# Patient Record
Sex: MALE | Race: WHITE | Employment: OTHER | URBAN - METROPOLITAN AREA
[De-identification: names, ages, dates, MRNs, and addresses within clinical notes are randomized per-mention and may not be internally consistent; named-entity substitution may affect disease eponyms.]

---

## 2017-05-24 ENCOUNTER — HOSPITAL ENCOUNTER (OUTPATIENT)
Dept: SURGERY | Age: 83
Discharge: HOME | End: 2017-05-24
Attending: OPHTHALMOLOGY | Admitting: OPHTHALMOLOGY

## 2017-05-24 VITALS — HEART RATE: 72 BPM | SYSTOLIC BLOOD PRESSURE: 144 MMHG | DIASTOLIC BLOOD PRESSURE: 79 MMHG

## 2017-05-24 RX ORDER — PHENYLEPHRINE HCL 2.5 %
1 DROPS OPHTHALMIC (EYE) EVERY 5 MIN PRN
Status: DISCONTINUED | OUTPATIENT
Start: 2017-05-24 | End: 2017-05-25 | Stop reason: HOSPADM

## 2017-05-24 RX ORDER — PREDNISOLONE ACETATE 10 MG/ML
1 SUSPENSION/ DROPS OPHTHALMIC
Status: COMPLETED | OUTPATIENT
Start: 2017-05-24 | End: 2017-05-24

## 2017-05-24 RX ORDER — LEVOTHYROXINE SODIUM 0.05 MG/1
50 TABLET ORAL DAILY
COMMUNITY

## 2017-05-24 RX ORDER — COVID-19 ANTIGEN TEST
1 KIT MISCELLANEOUS DAILY
COMMUNITY

## 2017-05-24 RX ORDER — PROPARACAINE HYDROCHLORIDE 5 MG/ML
1 SOLUTION/ DROPS OPHTHALMIC
Status: COMPLETED | OUTPATIENT
Start: 2017-05-24 | End: 2017-05-24

## 2017-05-24 RX ORDER — TROPICAMIDE 10 MG/ML
1 SOLUTION/ DROPS OPHTHALMIC EVERY 5 MIN PRN
Status: DISCONTINUED | OUTPATIENT
Start: 2017-05-24 | End: 2017-05-25 | Stop reason: HOSPADM

## 2017-05-24 RX ADMIN — TROPICAMIDE 1 DROP: 10 SOLUTION/ DROPS OPHTHALMIC at 14:06

## 2017-05-24 RX ADMIN — PREDNISOLONE ACETATE 1 DROP: 10 SUSPENSION/ DROPS OPHTHALMIC at 14:50

## 2017-05-24 RX ADMIN — Medication 1 DROP: at 14:00

## 2017-05-24 RX ADMIN — TROPICAMIDE 1 DROP: 10 SOLUTION/ DROPS OPHTHALMIC at 14:00

## 2017-05-24 RX ADMIN — PROPARACAINE HYDROCHLORIDE 1 DROP: 5 SOLUTION/ DROPS OPHTHALMIC at 14:47

## 2017-05-24 RX ADMIN — Medication 1 DROP: at 14:06

## 2017-06-07 ENCOUNTER — HOSPITAL ENCOUNTER (OUTPATIENT)
Dept: SURGERY | Age: 83
Discharge: HOME | End: 2017-06-07
Attending: OPHTHALMOLOGY | Admitting: OPHTHALMOLOGY

## 2017-06-07 VITALS
SYSTOLIC BLOOD PRESSURE: 125 MMHG | DIASTOLIC BLOOD PRESSURE: 70 MMHG | HEART RATE: 83 BPM | RESPIRATION RATE: 18 BRPM | OXYGEN SATURATION: 96 %

## 2017-06-07 RX ORDER — PREDNISOLONE ACETATE 10 MG/ML
1 SUSPENSION/ DROPS OPHTHALMIC
Status: COMPLETED | OUTPATIENT
Start: 2017-06-07 | End: 2017-06-07

## 2017-06-07 RX ORDER — PHENYLEPHRINE HCL 2.5 %
1 DROPS OPHTHALMIC (EYE) EVERY 5 MIN PRN
Status: DISCONTINUED | OUTPATIENT
Start: 2017-06-07 | End: 2017-06-08 | Stop reason: HOSPADM

## 2017-06-07 RX ORDER — PROPARACAINE HYDROCHLORIDE 5 MG/ML
1 SOLUTION/ DROPS OPHTHALMIC
Status: COMPLETED | OUTPATIENT
Start: 2017-06-07 | End: 2017-06-07

## 2017-06-07 RX ORDER — TROPICAMIDE 10 MG/ML
1 SOLUTION/ DROPS OPHTHALMIC EVERY 5 MIN PRN
Status: DISCONTINUED | OUTPATIENT
Start: 2017-06-07 | End: 2017-06-08 | Stop reason: HOSPADM

## 2017-06-07 RX ADMIN — Medication 1 DROP: at 13:13

## 2017-06-07 RX ADMIN — PROPARACAINE HYDROCHLORIDE 1 DROP: 5 SOLUTION/ DROPS OPHTHALMIC at 13:55

## 2017-06-07 RX ADMIN — PREDNISOLONE ACETATE 1 DROP: 10 SUSPENSION/ DROPS OPHTHALMIC at 13:58

## 2017-06-07 RX ADMIN — TROPICAMIDE 1 DROP: 10 SOLUTION/ DROPS OPHTHALMIC at 13:07

## 2017-06-07 RX ADMIN — TROPICAMIDE 1 DROP: 10 SOLUTION/ DROPS OPHTHALMIC at 13:13

## 2017-06-07 RX ADMIN — Medication 1 DROP: at 13:07

## 2017-06-07 ASSESSMENT — PAIN - FUNCTIONAL ASSESSMENT: PAIN_FUNCTIONAL_ASSESSMENT: 0-10

## 2019-01-22 ENCOUNTER — TELEPHONE (OUTPATIENT)
Dept: ORTHOPEDIC SURGERY | Age: 85
End: 2019-01-22

## 2019-01-22 ENCOUNTER — OFFICE VISIT (OUTPATIENT)
Dept: ORTHOPEDIC SURGERY | Age: 85
End: 2019-01-22
Payer: MEDICARE

## 2019-01-22 VITALS — WEIGHT: 167.99 LBS | HEIGHT: 68 IN | BODY MASS INDEX: 25.46 KG/M2 | RESPIRATION RATE: 16 BRPM

## 2019-01-22 DIAGNOSIS — G56.03 CARPAL TUNNEL SYNDROME ON BOTH SIDES: ICD-10-CM

## 2019-01-22 PROCEDURE — 4004F PT TOBACCO SCREEN RCVD TLK: CPT | Performed by: ORTHOPAEDIC SURGERY

## 2019-01-22 PROCEDURE — 1101F PT FALLS ASSESS-DOCD LE1/YR: CPT | Performed by: ORTHOPAEDIC SURGERY

## 2019-01-22 PROCEDURE — 99203 OFFICE O/P NEW LOW 30 MIN: CPT | Performed by: ORTHOPAEDIC SURGERY

## 2019-01-22 PROCEDURE — G8427 DOCREV CUR MEDS BY ELIG CLIN: HCPCS | Performed by: ORTHOPAEDIC SURGERY

## 2019-01-22 PROCEDURE — 4040F PNEUMOC VAC/ADMIN/RCVD: CPT | Performed by: ORTHOPAEDIC SURGERY

## 2019-01-22 PROCEDURE — G8419 CALC BMI OUT NRM PARAM NOF/U: HCPCS | Performed by: ORTHOPAEDIC SURGERY

## 2019-01-22 PROCEDURE — 1123F ACP DISCUSS/DSCN MKR DOCD: CPT | Performed by: ORTHOPAEDIC SURGERY

## 2019-01-22 PROCEDURE — G8484 FLU IMMUNIZE NO ADMIN: HCPCS | Performed by: ORTHOPAEDIC SURGERY

## 2019-01-22 RX ORDER — AMLODIPINE BESYLATE 5 MG/1
5 TABLET ORAL
COMMUNITY
Start: 2018-10-11

## 2019-01-25 RX ORDER — LIDOCAINE HYDROCHLORIDE 10 MG/ML
1 INJECTION, SOLUTION EPIDURAL; INFILTRATION; INTRACAUDAL; PERINEURAL
Status: CANCELLED | OUTPATIENT
Start: 2019-01-25 | End: 2019-01-25

## 2019-01-25 RX ORDER — SODIUM CHLORIDE 0.9 % (FLUSH) 0.9 %
10 SYRINGE (ML) INJECTION PRN
Status: CANCELLED | OUTPATIENT
Start: 2019-01-25

## 2019-01-25 RX ORDER — SODIUM CHLORIDE 0.9 % (FLUSH) 0.9 %
10 SYRINGE (ML) INJECTION EVERY 12 HOURS SCHEDULED
Status: CANCELLED | OUTPATIENT
Start: 2019-01-25

## 2019-01-25 RX ORDER — SODIUM CHLORIDE, SODIUM LACTATE, POTASSIUM CHLORIDE, CALCIUM CHLORIDE 600; 310; 30; 20 MG/100ML; MG/100ML; MG/100ML; MG/100ML
INJECTION, SOLUTION INTRAVENOUS CONTINUOUS
Status: CANCELLED | OUTPATIENT
Start: 2019-01-25

## 2019-01-28 ENCOUNTER — HOSPITAL ENCOUNTER (OUTPATIENT)
Age: 85
Setting detail: OUTPATIENT SURGERY
Discharge: HOME | End: 2019-01-28
Attending: ORTHOPAEDIC SURGERY | Admitting: ORTHOPAEDIC SURGERY
Payer: MEDICARE

## 2019-01-28 VITALS
RESPIRATION RATE: 16 BRPM | DIASTOLIC BLOOD PRESSURE: 74 MMHG | HEIGHT: 70 IN | OXYGEN SATURATION: 97 % | WEIGHT: 163 LBS | BODY MASS INDEX: 23.34 KG/M2 | HEART RATE: 64 BPM | SYSTOLIC BLOOD PRESSURE: 161 MMHG | TEMPERATURE: 97.8 F

## 2019-01-28 DIAGNOSIS — G56.03 CARPAL TUNNEL SYNDROME ON BOTH SIDES: Primary | ICD-10-CM

## 2019-01-28 PROCEDURE — 2580000003 HC RX 258: Performed by: ORTHOPAEDIC SURGERY

## 2019-01-28 PROCEDURE — 2709999900 HC NON-CHARGEABLE SUPPLY: Performed by: ORTHOPAEDIC SURGERY

## 2019-01-28 PROCEDURE — 3600000004 HC SURGERY LEVEL 4 BASE: Performed by: ORTHOPAEDIC SURGERY

## 2019-01-28 PROCEDURE — 3600000014 HC SURGERY LEVEL 4 ADDTL 15MIN: Performed by: ORTHOPAEDIC SURGERY

## 2019-01-28 PROCEDURE — 7100000010 HC PHASE II RECOVERY - FIRST 15 MIN: Performed by: ORTHOPAEDIC SURGERY

## 2019-01-28 RX ORDER — HYDROCODONE BITARTRATE AND ACETAMINOPHEN 5; 325 MG/1; MG/1
1 TABLET ORAL EVERY 6 HOURS PRN
Qty: 10 TABLET | Refills: 0 | Status: SHIPPED | OUTPATIENT
Start: 2019-01-28 | End: 2019-02-04

## 2019-01-28 RX ORDER — MAGNESIUM HYDROXIDE 1200 MG/15ML
LIQUID ORAL CONTINUOUS PRN
Status: DISCONTINUED | OUTPATIENT
Start: 2019-01-28 | End: 2019-01-28 | Stop reason: HOSPADM

## 2019-02-08 ENCOUNTER — OFFICE VISIT (OUTPATIENT)
Dept: ORTHOPEDIC SURGERY | Age: 85
End: 2019-02-08
Payer: MEDICARE

## 2019-02-08 VITALS — WEIGHT: 162.92 LBS | HEIGHT: 70 IN | BODY MASS INDEX: 23.32 KG/M2

## 2019-02-08 DIAGNOSIS — G56.03 CARPAL TUNNEL SYNDROME ON BOTH SIDES: Primary | ICD-10-CM

## 2019-02-08 PROCEDURE — L3908 WHO COCK-UP NONMOLDE PRE OTS: HCPCS | Performed by: ORTHOPAEDIC SURGERY

## 2019-02-08 PROCEDURE — 99024 POSTOP FOLLOW-UP VISIT: CPT | Performed by: ORTHOPAEDIC SURGERY

## 2025-03-06 ENCOUNTER — APPOINTMENT (OUTPATIENT)
Dept: GENERAL RADIOLOGY | Age: 89
DRG: 374 | End: 2025-03-06
Payer: MEDICARE

## 2025-03-06 ENCOUNTER — APPOINTMENT (OUTPATIENT)
Dept: CT IMAGING | Age: 89
DRG: 374 | End: 2025-03-06
Payer: MEDICARE

## 2025-03-06 ENCOUNTER — HOSPITAL ENCOUNTER (INPATIENT)
Age: 89
LOS: 6 days | Discharge: HOSPICE | DRG: 374 | End: 2025-03-12
Attending: STUDENT IN AN ORGANIZED HEALTH CARE EDUCATION/TRAINING PROGRAM | Admitting: HOSPITALIST
Payer: MEDICARE

## 2025-03-06 DIAGNOSIS — C18.9 METASTATIC COLON CANCER TO LIVER (HCC): ICD-10-CM

## 2025-03-06 DIAGNOSIS — I82.4Y1 ACUTE DEEP VEIN THROMBOSIS (DVT) OF PROXIMAL VEIN OF RIGHT LOWER EXTREMITY (HCC): ICD-10-CM

## 2025-03-06 DIAGNOSIS — K63.89 COLONIC MASS: ICD-10-CM

## 2025-03-06 DIAGNOSIS — Z51.5 HOSPICE CARE: ICD-10-CM

## 2025-03-06 DIAGNOSIS — I26.99 OTHER ACUTE PULMONARY EMBOLISM WITHOUT ACUTE COR PULMONALE (HCC): Primary | ICD-10-CM

## 2025-03-06 DIAGNOSIS — C78.7 METASTATIC COLON CANCER TO LIVER (HCC): ICD-10-CM

## 2025-03-06 LAB
ALBUMIN SERPL-MCNC: 3.2 G/DL (ref 3.4–5)
ALBUMIN/GLOB SERPL: 0.8 {RATIO} (ref 1.1–2.2)
ALP SERPL-CCNC: 248 U/L (ref 40–129)
ALT SERPL-CCNC: 29 U/L (ref 10–40)
ANION GAP SERPL CALCULATED.3IONS-SCNC: 18 MMOL/L (ref 3–16)
ANTI-XA UNFRAC HEPARIN: 0.64 IU/ML (ref 0.3–0.7)
APTT BLD: 117.3 SEC (ref 22.1–36.4)
APTT BLD: 39.5 SEC (ref 22.1–36.4)
AST SERPL-CCNC: 51 U/L (ref 15–37)
BASOPHILS # BLD: 0 K/UL (ref 0–0.2)
BASOPHILS NFR BLD: 0.2 %
BILIRUB SERPL-MCNC: 0.4 MG/DL (ref 0–1)
BILIRUB UR QL STRIP.AUTO: NEGATIVE
BUN SERPL-MCNC: 40 MG/DL (ref 7–20)
CALCIUM SERPL-MCNC: 10.6 MG/DL (ref 8.3–10.6)
CHLORIDE SERPL-SCNC: 101 MMOL/L (ref 99–110)
CLARITY UR: CLEAR
CO2 SERPL-SCNC: 21 MMOL/L (ref 21–32)
COLOR UR: YELLOW
CREAT SERPL-MCNC: 1.7 MG/DL (ref 0.8–1.3)
D-DIMER QUANTITATIVE: >20 UG/ML FEU (ref 0–0.6)
DEPRECATED RDW RBC AUTO: 18.5 % (ref 12.4–15.4)
EKG ATRIAL RATE: 75 BPM
EKG DIAGNOSIS: NORMAL
EKG P AXIS: 36 DEGREES
EKG P-R INTERVAL: 142 MS
EKG Q-T INTERVAL: 410 MS
EKG QRS DURATION: 112 MS
EKG QTC CALCULATION (BAZETT): 457 MS
EKG R AXIS: 4 DEGREES
EKG T AXIS: 8 DEGREES
EKG VENTRICULAR RATE: 75 BPM
EOSINOPHIL # BLD: 0 K/UL (ref 0–0.6)
EOSINOPHIL NFR BLD: 0.1 %
EPI CELLS #/AREA URNS HPF: ABNORMAL /HPF (ref 0–5)
FLUAV RNA RESP QL NAA+PROBE: NOT DETECTED
FLUBV RNA RESP QL NAA+PROBE: NOT DETECTED
GFR SERPLBLD CREATININE-BSD FMLA CKD-EPI: 37 ML/MIN/{1.73_M2}
GLUCOSE SERPL-MCNC: 156 MG/DL (ref 70–99)
GLUCOSE UR STRIP.AUTO-MCNC: NEGATIVE MG/DL
HCT VFR BLD AUTO: 31.8 % (ref 40.5–52.5)
HGB BLD-MCNC: 9.5 G/DL (ref 13.5–17.5)
HGB UR QL STRIP.AUTO: NEGATIVE
HYALINE CASTS #/AREA URNS LPF: ABNORMAL /LPF (ref 0–2)
KETONES UR STRIP.AUTO-MCNC: ABNORMAL MG/DL
LEUKOCYTE ESTERASE UR QL STRIP.AUTO: NEGATIVE
LYMPHOCYTES # BLD: 1.3 K/UL (ref 1–5.1)
LYMPHOCYTES NFR BLD: 7 %
MCH RBC QN AUTO: 23.4 PG (ref 26–34)
MCHC RBC AUTO-ENTMCNC: 30 G/DL (ref 31–36)
MCV RBC AUTO: 78 FL (ref 80–100)
MONOCYTES # BLD: 1.6 K/UL (ref 0–1.3)
MONOCYTES NFR BLD: 8.5 %
NEUTROPHILS # BLD: 15.7 K/UL (ref 1.7–7.7)
NEUTROPHILS NFR BLD: 84.2 %
NITRITE UR QL STRIP.AUTO: NEGATIVE
PH UR STRIP.AUTO: 5.5 [PH] (ref 5–8)
PLATELET # BLD AUTO: 375 K/UL (ref 135–450)
PMV BLD AUTO: 7.8 FL (ref 5–10.5)
POTASSIUM SERPL-SCNC: 5 MMOL/L (ref 3.5–5.1)
PROT SERPL-MCNC: 7 G/DL (ref 6.4–8.2)
PROT UR STRIP.AUTO-MCNC: ABNORMAL MG/DL
RBC # BLD AUTO: 4.07 M/UL (ref 4.2–5.9)
RBC #/AREA URNS HPF: ABNORMAL /HPF (ref 0–4)
SARS-COV-2 RNA RESP QL NAA+PROBE: NOT DETECTED
SODIUM SERPL-SCNC: 140 MMOL/L (ref 136–145)
SP GR UR STRIP.AUTO: 1.01 (ref 1–1.03)
TROPONIN, HIGH SENSITIVITY: 44 NG/L (ref 0–22)
TROPONIN, HIGH SENSITIVITY: 47 NG/L (ref 0–22)
UA DIPSTICK W REFLEX MICRO PNL UR: YES
URN SPEC COLLECT METH UR: ABNORMAL
UROBILINOGEN UR STRIP-ACNC: 0.2 E.U./DL
WBC # BLD AUTO: 18.6 K/UL (ref 4–11)
WBC #/AREA URNS HPF: ABNORMAL /HPF (ref 0–5)

## 2025-03-06 PROCEDURE — 36415 COLL VENOUS BLD VENIPUNCTURE: CPT

## 2025-03-06 PROCEDURE — 2700000000 HC OXYGEN THERAPY PER DAY

## 2025-03-06 PROCEDURE — 87636 SARSCOV2 & INF A&B AMP PRB: CPT

## 2025-03-06 PROCEDURE — 81001 URINALYSIS AUTO W/SCOPE: CPT

## 2025-03-06 PROCEDURE — 99285 EMERGENCY DEPT VISIT HI MDM: CPT

## 2025-03-06 PROCEDURE — 6360000004 HC RX CONTRAST MEDICATION: Performed by: STUDENT IN AN ORGANIZED HEALTH CARE EDUCATION/TRAINING PROGRAM

## 2025-03-06 PROCEDURE — 93010 ELECTROCARDIOGRAM REPORT: CPT | Performed by: INTERNAL MEDICINE

## 2025-03-06 PROCEDURE — 85025 COMPLETE CBC W/AUTO DIFF WBC: CPT

## 2025-03-06 PROCEDURE — 93005 ELECTROCARDIOGRAM TRACING: CPT | Performed by: STUDENT IN AN ORGANIZED HEALTH CARE EDUCATION/TRAINING PROGRAM

## 2025-03-06 PROCEDURE — 71045 X-RAY EXAM CHEST 1 VIEW: CPT

## 2025-03-06 PROCEDURE — 74177 CT ABD & PELVIS W/CONTRAST: CPT

## 2025-03-06 PROCEDURE — 85730 THROMBOPLASTIN TIME PARTIAL: CPT

## 2025-03-06 PROCEDURE — 2580000003 HC RX 258: Performed by: STUDENT IN AN ORGANIZED HEALTH CARE EDUCATION/TRAINING PROGRAM

## 2025-03-06 PROCEDURE — 85520 HEPARIN ASSAY: CPT

## 2025-03-06 PROCEDURE — 71260 CT THORAX DX C+: CPT

## 2025-03-06 PROCEDURE — 94761 N-INVAS EAR/PLS OXIMETRY MLT: CPT

## 2025-03-06 PROCEDURE — 84484 ASSAY OF TROPONIN QUANT: CPT

## 2025-03-06 PROCEDURE — 80053 COMPREHEN METABOLIC PANEL: CPT

## 2025-03-06 PROCEDURE — 6360000002 HC RX W HCPCS: Performed by: STUDENT IN AN ORGANIZED HEALTH CARE EDUCATION/TRAINING PROGRAM

## 2025-03-06 PROCEDURE — 85379 FIBRIN DEGRADATION QUANT: CPT

## 2025-03-06 PROCEDURE — 1200000000 HC SEMI PRIVATE

## 2025-03-06 RX ORDER — ACETAMINOPHEN 650 MG/1
650 SUPPOSITORY RECTAL EVERY 6 HOURS PRN
Status: DISCONTINUED | OUTPATIENT
Start: 2025-03-06 | End: 2025-03-12 | Stop reason: HOSPADM

## 2025-03-06 RX ORDER — IBUPROFEN 200 MG
200 CAPSULE ORAL DAILY
Status: DISCONTINUED | OUTPATIENT
Start: 2025-03-07 | End: 2025-03-06 | Stop reason: CLARIF

## 2025-03-06 RX ORDER — HEPARIN SODIUM 1000 [USP'U]/ML
80 INJECTION, SOLUTION INTRAVENOUS; SUBCUTANEOUS PRN
Status: DISCONTINUED | OUTPATIENT
Start: 2025-03-07 | End: 2025-03-12 | Stop reason: HOSPADM

## 2025-03-06 RX ORDER — OXYCODONE AND ACETAMINOPHEN 5; 325 MG/1; MG/1
1 TABLET ORAL EVERY 4 HOURS PRN
Status: DISCONTINUED | OUTPATIENT
Start: 2025-03-06 | End: 2025-03-12 | Stop reason: HOSPADM

## 2025-03-06 RX ORDER — FAMOTIDINE 10 MG
10 TABLET ORAL 2 TIMES DAILY
COMMUNITY

## 2025-03-06 RX ORDER — AMLODIPINE BESYLATE 5 MG/1
5 TABLET ORAL DAILY
COMMUNITY

## 2025-03-06 RX ORDER — POLYETHYLENE GLYCOL 3350 17 G/17G
17 POWDER, FOR SOLUTION ORAL DAILY PRN
Status: DISCONTINUED | OUTPATIENT
Start: 2025-03-06 | End: 2025-03-12 | Stop reason: HOSPADM

## 2025-03-06 RX ORDER — PROCHLORPERAZINE EDISYLATE 5 MG/ML
10 INJECTION INTRAMUSCULAR; INTRAVENOUS EVERY 6 HOURS PRN
Status: DISCONTINUED | OUTPATIENT
Start: 2025-03-06 | End: 2025-03-12 | Stop reason: HOSPADM

## 2025-03-06 RX ORDER — SODIUM CHLORIDE 0.9 % (FLUSH) 0.9 %
5-40 SYRINGE (ML) INJECTION EVERY 12 HOURS SCHEDULED
Status: DISCONTINUED | OUTPATIENT
Start: 2025-03-06 | End: 2025-03-12 | Stop reason: HOSPADM

## 2025-03-06 RX ORDER — CALCIUM CARBONATE 500(1250)
250 TABLET ORAL DAILY
Status: DISCONTINUED | OUTPATIENT
Start: 2025-03-07 | End: 2025-03-12 | Stop reason: HOSPADM

## 2025-03-06 RX ORDER — 0.9 % SODIUM CHLORIDE 0.9 %
1000 INTRAVENOUS SOLUTION INTRAVENOUS ONCE
Status: COMPLETED | OUTPATIENT
Start: 2025-03-06 | End: 2025-03-06

## 2025-03-06 RX ORDER — HEPARIN SODIUM 1000 [USP'U]/ML
80 INJECTION, SOLUTION INTRAVENOUS; SUBCUTANEOUS ONCE
Status: COMPLETED | OUTPATIENT
Start: 2025-03-06 | End: 2025-03-06

## 2025-03-06 RX ORDER — PANTOPRAZOLE SODIUM 40 MG/1
40 TABLET, DELAYED RELEASE ORAL
Status: DISCONTINUED | OUTPATIENT
Start: 2025-03-07 | End: 2025-03-12 | Stop reason: HOSPADM

## 2025-03-06 RX ORDER — IOPAMIDOL 755 MG/ML
75 INJECTION, SOLUTION INTRAVASCULAR
Status: COMPLETED | OUTPATIENT
Start: 2025-03-06 | End: 2025-03-06

## 2025-03-06 RX ORDER — SODIUM CHLORIDE 9 MG/ML
INJECTION, SOLUTION INTRAVENOUS PRN
Status: DISCONTINUED | OUTPATIENT
Start: 2025-03-06 | End: 2025-03-12 | Stop reason: HOSPADM

## 2025-03-06 RX ORDER — IBUPROFEN 200 MG
1 CAPSULE ORAL DAILY
COMMUNITY

## 2025-03-06 RX ORDER — LEVOTHYROXINE SODIUM 50 UG/1
50 TABLET ORAL DAILY
COMMUNITY

## 2025-03-06 RX ORDER — SODIUM CHLORIDE 0.9 % (FLUSH) 0.9 %
5-40 SYRINGE (ML) INJECTION PRN
Status: DISCONTINUED | OUTPATIENT
Start: 2025-03-06 | End: 2025-03-12 | Stop reason: HOSPADM

## 2025-03-06 RX ORDER — OMEPRAZOLE 20 MG/1
40 CAPSULE, DELAYED RELEASE ORAL DAILY
COMMUNITY

## 2025-03-06 RX ORDER — PREDNISONE 10 MG/1
10 TABLET ORAL DAILY
Status: ON HOLD | COMMUNITY
End: 2025-03-12 | Stop reason: HOSPADM

## 2025-03-06 RX ORDER — LEVOTHYROXINE SODIUM 50 UG/1
50 TABLET ORAL DAILY
Status: DISCONTINUED | OUTPATIENT
Start: 2025-03-07 | End: 2025-03-12 | Stop reason: HOSPADM

## 2025-03-06 RX ORDER — HEPARIN SODIUM 10000 [USP'U]/100ML
5-30 INJECTION, SOLUTION INTRAVENOUS CONTINUOUS
Status: DISCONTINUED | OUTPATIENT
Start: 2025-03-06 | End: 2025-03-12 | Stop reason: HOSPADM

## 2025-03-06 RX ORDER — MORPHINE SULFATE 2 MG/ML
2 INJECTION, SOLUTION INTRAMUSCULAR; INTRAVENOUS
Status: DISCONTINUED | OUTPATIENT
Start: 2025-03-06 | End: 2025-03-12 | Stop reason: HOSPADM

## 2025-03-06 RX ORDER — LOSARTAN POTASSIUM 100 MG/1
100 TABLET ORAL DAILY
Status: ON HOLD | COMMUNITY
End: 2025-03-12 | Stop reason: HOSPADM

## 2025-03-06 RX ORDER — HEPARIN SODIUM 1000 [USP'U]/ML
40 INJECTION, SOLUTION INTRAVENOUS; SUBCUTANEOUS PRN
Status: DISCONTINUED | OUTPATIENT
Start: 2025-03-07 | End: 2025-03-12 | Stop reason: HOSPADM

## 2025-03-06 RX ORDER — SIMVASTATIN 20 MG
20 TABLET ORAL NIGHTLY
Status: ON HOLD | COMMUNITY
End: 2025-03-12 | Stop reason: HOSPADM

## 2025-03-06 RX ORDER — ACETAMINOPHEN 325 MG/1
650 TABLET ORAL EVERY 6 HOURS PRN
Status: DISCONTINUED | OUTPATIENT
Start: 2025-03-06 | End: 2025-03-12 | Stop reason: HOSPADM

## 2025-03-06 RX ORDER — AMLODIPINE BESYLATE 5 MG/1
5 TABLET ORAL DAILY
Status: DISCONTINUED | OUTPATIENT
Start: 2025-03-07 | End: 2025-03-12 | Stop reason: HOSPADM

## 2025-03-06 RX ADMIN — IOPAMIDOL 75 ML: 755 INJECTION, SOLUTION INTRAVENOUS at 18:08

## 2025-03-06 RX ADMIN — HEPARIN SODIUM 18 UNITS/KG/HR: 10000 INJECTION, SOLUTION INTRAVENOUS at 19:35

## 2025-03-06 RX ADMIN — SODIUM CHLORIDE 1000 ML: 0.9 INJECTION, SOLUTION INTRAVENOUS at 17:25

## 2025-03-06 RX ADMIN — HEPARIN SODIUM 4200 UNITS: 1000 INJECTION INTRAVENOUS; SUBCUTANEOUS at 19:32

## 2025-03-06 ASSESSMENT — LIFESTYLE VARIABLES
HOW MANY STANDARD DRINKS CONTAINING ALCOHOL DO YOU HAVE ON A TYPICAL DAY: PATIENT DOES NOT DRINK
HOW OFTEN DO YOU HAVE A DRINK CONTAINING ALCOHOL: NEVER

## 2025-03-06 ASSESSMENT — PAIN - FUNCTIONAL ASSESSMENT: PAIN_FUNCTIONAL_ASSESSMENT: 0-10

## 2025-03-06 NOTE — ED PROVIDER NOTES
[facility] EMERGENCY DEPARTMENT     EMERGENCY DEPARTMENT ENCOUNTER            Pt Name: Marcos Barbosa   MRN: [id number]   Birthdate 12/31/1930   Date of evaluation: 3/6/2025   Provider: Alexia Joyce MD   PCP: No primary care provider on file.   Note Started: 3:08 PM EST 3/6/25          CHIEF COMPLAINT     Chief Complaint   Patient presents with    Abdominal Pain     Intermittently, got worse a few days ago. Recently dx with pulmonary fibrosis. One episode of vomiting a few days ago. Pcp concerned for pnuemonia and dehydration.        HISTORY OF PRESENT ILLNESS:   History from : Patient   Limitations to history : None     Marcos Barbosa is a 89 y.o. male who presents complaining of possible pneumonia.  Patient states that he has not been feeling well for the past few months.  Has had worsening shortness of breath and intermittent chest pains for the past few days.  He does have a history of pulmonary fibrosis and saw his primary care provider who sent him to the ER with concerns for possible pneumonia.  He had 1 episode of emesis a few days ago but is not having any abdominal pain or nausea currently.  Denies any fevers.  Denies cough.  Denies other complaints or concerns.  Denies leg pain or leg swelling    Nursing Notes were all reviewed and agreed with, or any disagreements were addressed in the HPI.     REVIEW OF SYSTEMS :    Positives and Pertinent negatives as per HPI.      MEDICAL HISTORY   has no past medical history on file.    No past surgical history on file.   CURRENTMEDICATIONS       Previous Medications    AMLODIPINE (NORVASC) 5 MG TABLET    Take 1 tablet by mouth daily    CALCIUM CITRATE (CALCITRATE) 950 (200 CA) MG TABLET    Take 1 tablet by mouth daily    FAMOTIDINE (PEPCID) 10 MG TABLET    Take 1 tablet by mouth 2 times daily    IPRATROPIUM (ATROVENT) 0.02 % NEBULIZER SOLUTION    Take 2.5 mLs by nebulization 4 times daily    LEVOTHYROXINE (SYNTHROID) 50 MCG TABLET    Take 1

## 2025-03-07 ENCOUNTER — APPOINTMENT (OUTPATIENT)
Dept: VASCULAR LAB | Age: 89
DRG: 374 | End: 2025-03-07
Payer: MEDICARE

## 2025-03-07 LAB
ALBUMIN SERPL-MCNC: 2.7 G/DL (ref 3.4–5)
ALBUMIN/GLOB SERPL: 0.8 {RATIO} (ref 1.1–2.2)
ALP SERPL-CCNC: 217 U/L (ref 40–129)
ALT SERPL-CCNC: 21 U/L (ref 10–40)
ANION GAP SERPL CALCULATED.3IONS-SCNC: 14 MMOL/L (ref 3–16)
ANTI-XA UNFRAC HEPARIN: 0.43 IU/ML (ref 0.3–0.7)
ANTI-XA UNFRAC HEPARIN: 0.53 IU/ML (ref 0.3–0.7)
AST SERPL-CCNC: 44 U/L (ref 15–37)
BASOPHILS # BLD: 0 K/UL (ref 0–0.2)
BASOPHILS NFR BLD: 0.3 %
BILIRUB SERPL-MCNC: 0.4 MG/DL (ref 0–1)
BUN SERPL-MCNC: 36 MG/DL (ref 7–20)
CALCIUM SERPL-MCNC: 9.7 MG/DL (ref 8.3–10.6)
CEA SERPL-MCNC: 7957 NG/ML (ref 0–5)
CHLORIDE SERPL-SCNC: 105 MMOL/L (ref 99–110)
CO2 SERPL-SCNC: 21 MMOL/L (ref 21–32)
CREAT SERPL-MCNC: 1.5 MG/DL (ref 0.8–1.3)
DEPRECATED RDW RBC AUTO: 18.7 % (ref 12.4–15.4)
ECHO BSA: 1.55 M2
EOSINOPHIL # BLD: 0 K/UL (ref 0–0.6)
EOSINOPHIL NFR BLD: 0.1 %
EST. AVERAGE GLUCOSE BLD GHB EST-MCNC: 125.5 MG/DL
FERRITIN SERPL IA-MCNC: 301 NG/ML (ref 30–400)
GFR SERPLBLD CREATININE-BSD FMLA CKD-EPI: 43 ML/MIN/{1.73_M2}
GLUCOSE SERPL-MCNC: 70 MG/DL (ref 70–99)
HBA1C MFR BLD: 6 %
HCT VFR BLD AUTO: 28.5 % (ref 40.5–52.5)
HGB BLD-MCNC: 8.6 G/DL (ref 13.5–17.5)
IRON SATN MFR SERPL: 8 % (ref 20–50)
IRON SERPL-MCNC: 15 UG/DL (ref 59–158)
LYMPHOCYTES # BLD: 1.7 K/UL (ref 1–5.1)
LYMPHOCYTES NFR BLD: 11.1 %
MAGNESIUM SERPL-MCNC: 2.23 MG/DL (ref 1.8–2.4)
MCH RBC QN AUTO: 23.7 PG (ref 26–34)
MCHC RBC AUTO-ENTMCNC: 30.3 G/DL (ref 31–36)
MCV RBC AUTO: 78.2 FL (ref 80–100)
MONOCYTES # BLD: 1.3 K/UL (ref 0–1.3)
MONOCYTES NFR BLD: 8.6 %
NEUTROPHILS # BLD: 12.4 K/UL (ref 1.7–7.7)
NEUTROPHILS NFR BLD: 79.9 %
PLATELET # BLD AUTO: 343 K/UL (ref 135–450)
PMV BLD AUTO: 7.8 FL (ref 5–10.5)
POTASSIUM SERPL-SCNC: 4.4 MMOL/L (ref 3.5–5.1)
PROT SERPL-MCNC: 6.1 G/DL (ref 6.4–8.2)
RBC # BLD AUTO: 3.64 M/UL (ref 4.2–5.9)
SODIUM SERPL-SCNC: 140 MMOL/L (ref 136–145)
TIBC SERPL-MCNC: 184 UG/DL (ref 260–445)
WBC # BLD AUTO: 15.5 K/UL (ref 4–11)

## 2025-03-07 PROCEDURE — 36415 COLL VENOUS BLD VENIPUNCTURE: CPT

## 2025-03-07 PROCEDURE — 2500000003 HC RX 250 WO HCPCS: Performed by: NURSE PRACTITIONER

## 2025-03-07 PROCEDURE — 2700000000 HC OXYGEN THERAPY PER DAY

## 2025-03-07 PROCEDURE — 85520 HEPARIN ASSAY: CPT

## 2025-03-07 PROCEDURE — 94760 N-INVAS EAR/PLS OXIMETRY 1: CPT

## 2025-03-07 PROCEDURE — 1200000000 HC SEMI PRIVATE

## 2025-03-07 PROCEDURE — 83550 IRON BINDING TEST: CPT

## 2025-03-07 PROCEDURE — 82105 ALPHA-FETOPROTEIN SERUM: CPT

## 2025-03-07 PROCEDURE — 82378 CARCINOEMBRYONIC ANTIGEN: CPT

## 2025-03-07 PROCEDURE — 83540 ASSAY OF IRON: CPT

## 2025-03-07 PROCEDURE — 93970 EXTREMITY STUDY: CPT

## 2025-03-07 PROCEDURE — 6360000002 HC RX W HCPCS: Performed by: NURSE PRACTITIONER

## 2025-03-07 PROCEDURE — 85025 COMPLETE CBC W/AUTO DIFF WBC: CPT

## 2025-03-07 PROCEDURE — 83036 HEMOGLOBIN GLYCOSYLATED A1C: CPT

## 2025-03-07 PROCEDURE — 83735 ASSAY OF MAGNESIUM: CPT

## 2025-03-07 PROCEDURE — 94669 MECHANICAL CHEST WALL OSCILL: CPT

## 2025-03-07 PROCEDURE — 93970 EXTREMITY STUDY: CPT | Performed by: INTERNAL MEDICINE

## 2025-03-07 PROCEDURE — 99223 1ST HOSP IP/OBS HIGH 75: CPT | Performed by: INTERNAL MEDICINE

## 2025-03-07 PROCEDURE — 6370000000 HC RX 637 (ALT 250 FOR IP): Performed by: NURSE PRACTITIONER

## 2025-03-07 PROCEDURE — 82728 ASSAY OF FERRITIN: CPT

## 2025-03-07 PROCEDURE — 6360000002 HC RX W HCPCS: Performed by: STUDENT IN AN ORGANIZED HEALTH CARE EDUCATION/TRAINING PROGRAM

## 2025-03-07 PROCEDURE — 80053 COMPREHEN METABOLIC PANEL: CPT

## 2025-03-07 PROCEDURE — 94640 AIRWAY INHALATION TREATMENT: CPT

## 2025-03-07 RX ADMIN — CALCIUM 250 MG: 500 TABLET ORAL at 07:52

## 2025-03-07 RX ADMIN — IPRATROPIUM BROMIDE 0.5 MG: 0.5 SOLUTION RESPIRATORY (INHALATION) at 08:55

## 2025-03-07 RX ADMIN — IPRATROPIUM BROMIDE 0.5 MG: 0.5 SOLUTION RESPIRATORY (INHALATION) at 19:42

## 2025-03-07 RX ADMIN — IPRATROPIUM BROMIDE 0.5 MG: 0.5 SOLUTION RESPIRATORY (INHALATION) at 12:57

## 2025-03-07 RX ADMIN — PANTOPRAZOLE SODIUM 40 MG: 40 TABLET, DELAYED RELEASE ORAL at 05:19

## 2025-03-07 RX ADMIN — SODIUM CHLORIDE, PRESERVATIVE FREE 10 ML: 5 INJECTION INTRAVENOUS at 07:56

## 2025-03-07 RX ADMIN — IPRATROPIUM BROMIDE 0.5 MG: 0.5 SOLUTION RESPIRATORY (INHALATION) at 16:36

## 2025-03-07 RX ADMIN — HEPARIN SODIUM 18 UNITS/KG/HR: 10000 INJECTION, SOLUTION INTRAVENOUS at 19:17

## 2025-03-07 RX ADMIN — AMLODIPINE BESYLATE 5 MG: 5 TABLET ORAL at 07:52

## 2025-03-07 RX ADMIN — LEVOTHYROXINE SODIUM 50 MCG: 0.05 TABLET ORAL at 05:19

## 2025-03-07 NOTE — ED NOTES
Marcos Barbosa is a 89 y.o. male admitted for  Principal Problem:    Pulmonary embolus, left (HCC)  Resolved Problems:    * No resolved hospital problems. *  .   Patient Home via family with   Chief Complaint   Patient presents with    Abdominal Pain     Intermittently, got worse a few days ago. Recently dx with pulmonary fibrosis. One episode of vomiting a few days ago. Pcp concerned for pnuemonia and dehydration.   .  Patient is alert and Person, Place, Time, and Situation  Patient's baseline mobility: Baseline Mobility: Walker and Cane   Code Status: No Order   Cardiac Rhythm: Cardiac Rhythm: Sinus rhythm     Is patient on baseline Oxygen: no how many Liters:   Abnormal Assessment Findings:     Isolation:       NIH Score:    C-SSRS: Risk of Suicide: No Risk  Bedside swallow:        Active LDA's:   Peripheral IV 03/06/25 Left Antecubital (Active)   Site Assessment Clean, dry & intact 03/06/25 1541   Line Status Blood return noted 03/06/25 1541   Line Care Connections checked and tightened 03/06/25 1541   Phlebitis Assessment No symptoms 03/06/25 1541   Infiltration Assessment 0 03/06/25 1541   Dressing Status New dressing applied 03/06/25 1541   Dressing Type Transparent 03/06/25 1541   Dressing Intervention New 03/06/25 1541     Patient admitted with a medrano:  If the medrano is chronic was it exchanged:  Reason for medrano:   Patient admitted with Central Line:  . PICC line placement confirmed: YES OR NO:[id number]}   Reason for Central line:   Was central line Inserted from an outside facility:        Family/Caregiver Present no Any Concerns: no   Restraints   Sitter          Vitals: MEWS Score: 1    Vitals:    03/06/25 1650 03/06/25 1711 03/06/25 1738 03/06/25 1935   BP: (!) 121/54 110/74 127/63 135/70   Pulse: 71 67 65 82   Resp: 21 17 25 23   Temp:       TempSrc:       SpO2: 98% 98% 98% 100%   Weight:       Height:           Last documented pain score (0-10 scale)    Pain medication administered No.    Pertinent

## 2025-03-07 NOTE — CARE COORDINATION
Case Management Assessment  Initial Evaluation    Date/Time of Evaluation: 3/7/2025 2:04 PM  Assessment Completed by: Brinda Loza RN    If patient is discharged prior to next notation, then this note serves as note for discharge by case management.    Patient Name: Marcos Barbosa                   YOB: 1930  Diagnosis: Pulmonary embolus, left (HCC) [I26.99]  Metastatic colon cancer to liver (HCC) [C18.9, C78.7]  Acute deep vein thrombosis (DVT) of proximal vein of right lower extremity (HCC) [I82.4Y1]  Other acute pulmonary embolism without acute cor pulmonale (HCC) [I26.99]                   Date / Time: 3/6/2025  3:02 PM    Patient Admission Status: Inpatient   Readmission Risk (Low < 19, Mod (19-27), High > 27): Readmission Risk Score: 15    Current PCP: No primary care provider on file.  PCP verified by CM? Yes    Chart Reviewed: Yes      History Provided by: Patient  Patient Orientation: Alert and Oriented, Person, Place, Situation, Self    Patient Cognition: Alert    Hospitalization in the last 30 days (Readmission):  No    If yes, Readmission Assessment in CM Navigator will be completed.    Advance Directives:      Code Status: DNR-CCA   Patient's Primary Decision Maker is: Legal Next of Kin    Primary Decision Maker: Marsha Barbosa - Spouse - [phone]    Secondary Decision Maker: Marcos Barbosa - Child - [phone]    Discharge Planning:    Patient lives with: Spouse/Significant Other Type of Home: Apartment  Primary Care Giver: Self  Patient Support Systems include: Spouse/Significant Other, Children   Current Financial resources:    Current community resources:    Current services prior to admission: None            Current DME:              Type of Home Care services:  None    ADLS  Prior functional level:    Current functional level:      PT AM-PAC:   /24  OT AM-PAC:   /24    Family can provide assistance at DC:    Would you like Case Management to discuss the

## 2025-03-08 LAB
ALBUMIN SERPL-MCNC: 3 G/DL (ref 3.4–5)
ALBUMIN/GLOB SERPL: 0.9 {RATIO} (ref 1.1–2.2)
ALP SERPL-CCNC: 257 U/L (ref 40–129)
ALT SERPL-CCNC: 28 U/L (ref 10–40)
ANION GAP SERPL CALCULATED.3IONS-SCNC: 16 MMOL/L (ref 3–16)
ANTI-XA UNFRAC HEPARIN: 0.47 IU/ML (ref 0.3–0.7)
AST SERPL-CCNC: 67 U/L (ref 15–37)
BASOPHILS # BLD: 0.1 K/UL (ref 0–0.2)
BASOPHILS NFR BLD: 0.7 %
BILIRUB SERPL-MCNC: 0.5 MG/DL (ref 0–1)
BUN SERPL-MCNC: 29 MG/DL (ref 7–20)
CALCIUM SERPL-MCNC: 9.3 MG/DL (ref 8.3–10.6)
CHLORIDE SERPL-SCNC: 103 MMOL/L (ref 99–110)
CO2 SERPL-SCNC: 22 MMOL/L (ref 21–32)
CREAT SERPL-MCNC: 1.3 MG/DL (ref 0.8–1.3)
DEPRECATED RDW RBC AUTO: 18.7 % (ref 12.4–15.4)
EOSINOPHIL # BLD: 0.1 K/UL (ref 0–0.6)
EOSINOPHIL NFR BLD: 0.4 %
FOLATE SERPL-MCNC: 8.41 NG/ML (ref 4.78–24.2)
GFR SERPLBLD CREATININE-BSD FMLA CKD-EPI: 51 ML/MIN/{1.73_M2}
GLUCOSE SERPL-MCNC: 77 MG/DL (ref 70–99)
HCT VFR BLD AUTO: 31.1 % (ref 40.5–52.5)
HGB BLD-MCNC: 9.4 G/DL (ref 13.5–17.5)
LYMPHOCYTES # BLD: 1.9 K/UL (ref 1–5.1)
LYMPHOCYTES NFR BLD: 11.8 %
MAGNESIUM SERPL-MCNC: 2.16 MG/DL (ref 1.8–2.4)
MCH RBC QN AUTO: 23.5 PG (ref 26–34)
MCHC RBC AUTO-ENTMCNC: 30.2 G/DL (ref 31–36)
MCV RBC AUTO: 77.9 FL (ref 80–100)
MONOCYTES # BLD: 1.6 K/UL (ref 0–1.3)
MONOCYTES NFR BLD: 9.7 %
NEUTROPHILS # BLD: 12.4 K/UL (ref 1.7–7.7)
NEUTROPHILS NFR BLD: 77.4 %
PLATELET # BLD AUTO: 389 K/UL (ref 135–450)
PMV BLD AUTO: 7.9 FL (ref 5–10.5)
POTASSIUM SERPL-SCNC: 3.9 MMOL/L (ref 3.5–5.1)
PROT SERPL-MCNC: 6.4 G/DL (ref 6.4–8.2)
RBC # BLD AUTO: 4 M/UL (ref 4.2–5.9)
SODIUM SERPL-SCNC: 141 MMOL/L (ref 136–145)
VIT B12 SERPL-MCNC: 1658 PG/ML (ref 211–911)
WBC # BLD AUTO: 16 K/UL (ref 4–11)

## 2025-03-08 PROCEDURE — 94761 N-INVAS EAR/PLS OXIMETRY MLT: CPT

## 2025-03-08 PROCEDURE — 6360000002 HC RX W HCPCS: Performed by: NURSE PRACTITIONER

## 2025-03-08 PROCEDURE — 85025 COMPLETE CBC W/AUTO DIFF WBC: CPT

## 2025-03-08 PROCEDURE — 36415 COLL VENOUS BLD VENIPUNCTURE: CPT

## 2025-03-08 PROCEDURE — 1200000000 HC SEMI PRIVATE

## 2025-03-08 PROCEDURE — 82746 ASSAY OF FOLIC ACID SERUM: CPT

## 2025-03-08 PROCEDURE — 82607 VITAMIN B-12: CPT

## 2025-03-08 PROCEDURE — 6360000002 HC RX W HCPCS: Performed by: INTERNAL MEDICINE

## 2025-03-08 PROCEDURE — 80053 COMPREHEN METABOLIC PANEL: CPT

## 2025-03-08 PROCEDURE — 2700000000 HC OXYGEN THERAPY PER DAY

## 2025-03-08 PROCEDURE — 6370000000 HC RX 637 (ALT 250 FOR IP): Performed by: NURSE PRACTITIONER

## 2025-03-08 PROCEDURE — 99233 SBSQ HOSP IP/OBS HIGH 50: CPT | Performed by: INTERNAL MEDICINE

## 2025-03-08 PROCEDURE — 94640 AIRWAY INHALATION TREATMENT: CPT

## 2025-03-08 PROCEDURE — 6360000002 HC RX W HCPCS: Performed by: STUDENT IN AN ORGANIZED HEALTH CARE EDUCATION/TRAINING PROGRAM

## 2025-03-08 PROCEDURE — 83735 ASSAY OF MAGNESIUM: CPT

## 2025-03-08 PROCEDURE — 85520 HEPARIN ASSAY: CPT

## 2025-03-08 PROCEDURE — 2580000003 HC RX 258: Performed by: INTERNAL MEDICINE

## 2025-03-08 PROCEDURE — 2500000003 HC RX 250 WO HCPCS: Performed by: NURSE PRACTITIONER

## 2025-03-08 PROCEDURE — 94669 MECHANICAL CHEST WALL OSCILL: CPT

## 2025-03-08 PROCEDURE — 6370000000 HC RX 637 (ALT 250 FOR IP): Performed by: INTERNAL MEDICINE

## 2025-03-08 RX ORDER — PREDNISONE 5 MG/1
5 TABLET ORAL DAILY
Status: DISCONTINUED | OUTPATIENT
Start: 2025-03-08 | End: 2025-03-12 | Stop reason: HOSPADM

## 2025-03-08 RX ADMIN — LEVOTHYROXINE SODIUM 50 MCG: 0.05 TABLET ORAL at 06:14

## 2025-03-08 RX ADMIN — IPRATROPIUM BROMIDE 0.5 MG: 0.5 SOLUTION RESPIRATORY (INHALATION) at 16:11

## 2025-03-08 RX ADMIN — PANTOPRAZOLE SODIUM 40 MG: 40 TABLET, DELAYED RELEASE ORAL at 06:14

## 2025-03-08 RX ADMIN — CALCIUM 250 MG: 500 TABLET ORAL at 08:32

## 2025-03-08 RX ADMIN — IPRATROPIUM BROMIDE 0.5 MG: 0.5 SOLUTION RESPIRATORY (INHALATION) at 11:50

## 2025-03-08 RX ADMIN — HEPARIN SODIUM 18 UNITS/KG/HR: 10000 INJECTION, SOLUTION INTRAVENOUS at 21:08

## 2025-03-08 RX ADMIN — IPRATROPIUM BROMIDE 0.5 MG: 0.5 SOLUTION RESPIRATORY (INHALATION) at 08:11

## 2025-03-08 RX ADMIN — PANTOPRAZOLE SODIUM 40 MG: 40 TABLET, DELAYED RELEASE ORAL at 16:22

## 2025-03-08 RX ADMIN — PREDNISONE 5 MG: 5 TABLET ORAL at 08:32

## 2025-03-08 RX ADMIN — SODIUM CHLORIDE, PRESERVATIVE FREE 10 ML: 5 INJECTION INTRAVENOUS at 08:40

## 2025-03-08 RX ADMIN — IRON SUCROSE 200 MG: 20 INJECTION, SOLUTION INTRAVENOUS at 08:32

## 2025-03-08 RX ADMIN — AMLODIPINE BESYLATE 5 MG: 5 TABLET ORAL at 08:32

## 2025-03-08 RX ADMIN — IPRATROPIUM BROMIDE 0.5 MG: 0.5 SOLUTION RESPIRATORY (INHALATION) at 20:23

## 2025-03-09 LAB
ALBUMIN SERPL-MCNC: 2.7 G/DL (ref 3.4–5)
ALBUMIN/GLOB SERPL: 0.8 {RATIO} (ref 1.1–2.2)
ALP SERPL-CCNC: 259 U/L (ref 40–129)
ALT SERPL-CCNC: 27 U/L (ref 10–40)
ANION GAP SERPL CALCULATED.3IONS-SCNC: 12 MMOL/L (ref 3–16)
ANTI-XA UNFRAC HEPARIN: 0.4 IU/ML (ref 0.3–0.7)
AST SERPL-CCNC: 66 U/L (ref 15–37)
BASOPHILS # BLD: 0 K/UL (ref 0–0.2)
BASOPHILS NFR BLD: 0.2 %
BILIRUB SERPL-MCNC: 0.6 MG/DL (ref 0–1)
BUN SERPL-MCNC: 27 MG/DL (ref 7–20)
CALCIUM SERPL-MCNC: 9.3 MG/DL (ref 8.3–10.6)
CHLORIDE SERPL-SCNC: 103 MMOL/L (ref 99–110)
CO2 SERPL-SCNC: 24 MMOL/L (ref 21–32)
CREAT SERPL-MCNC: 1.2 MG/DL (ref 0.8–1.3)
DEPRECATED RDW RBC AUTO: 18.9 % (ref 12.4–15.4)
EOSINOPHIL # BLD: 0 K/UL (ref 0–0.6)
EOSINOPHIL NFR BLD: 0.2 %
GFR SERPLBLD CREATININE-BSD FMLA CKD-EPI: 56 ML/MIN/{1.73_M2}
GLUCOSE SERPL-MCNC: 95 MG/DL (ref 70–99)
HCT VFR BLD AUTO: 29.1 % (ref 40.5–52.5)
HGB BLD-MCNC: 8.7 G/DL (ref 13.5–17.5)
LYMPHOCYTES # BLD: 1.7 K/UL (ref 1–5.1)
LYMPHOCYTES NFR BLD: 10.5 %
MAGNESIUM SERPL-MCNC: 2.17 MG/DL (ref 1.8–2.4)
MCH RBC QN AUTO: 23.4 PG (ref 26–34)
MCHC RBC AUTO-ENTMCNC: 30 G/DL (ref 31–36)
MCV RBC AUTO: 77.9 FL (ref 80–100)
MONOCYTES # BLD: 1.3 K/UL (ref 0–1.3)
MONOCYTES NFR BLD: 8.4 %
NEUTROPHILS # BLD: 12.9 K/UL (ref 1.7–7.7)
NEUTROPHILS NFR BLD: 80.7 %
PLATELET # BLD AUTO: 390 K/UL (ref 135–450)
PMV BLD AUTO: 8.2 FL (ref 5–10.5)
POTASSIUM SERPL-SCNC: 4.1 MMOL/L (ref 3.5–5.1)
PROT SERPL-MCNC: 6.1 G/DL (ref 6.4–8.2)
RBC # BLD AUTO: 3.73 M/UL (ref 4.2–5.9)
SODIUM SERPL-SCNC: 139 MMOL/L (ref 136–145)
WBC # BLD AUTO: 16 K/UL (ref 4–11)

## 2025-03-09 PROCEDURE — 85520 HEPARIN ASSAY: CPT

## 2025-03-09 PROCEDURE — 6370000000 HC RX 637 (ALT 250 FOR IP): Performed by: INTERNAL MEDICINE

## 2025-03-09 PROCEDURE — 6370000000 HC RX 637 (ALT 250 FOR IP): Performed by: NURSE PRACTITIONER

## 2025-03-09 PROCEDURE — 2500000003 HC RX 250 WO HCPCS: Performed by: NURSE PRACTITIONER

## 2025-03-09 PROCEDURE — 6360000002 HC RX W HCPCS: Performed by: STUDENT IN AN ORGANIZED HEALTH CARE EDUCATION/TRAINING PROGRAM

## 2025-03-09 PROCEDURE — 85025 COMPLETE CBC W/AUTO DIFF WBC: CPT

## 2025-03-09 PROCEDURE — 80053 COMPREHEN METABOLIC PANEL: CPT

## 2025-03-09 PROCEDURE — 2580000003 HC RX 258: Performed by: INTERNAL MEDICINE

## 2025-03-09 PROCEDURE — 94640 AIRWAY INHALATION TREATMENT: CPT

## 2025-03-09 PROCEDURE — 83735 ASSAY OF MAGNESIUM: CPT

## 2025-03-09 PROCEDURE — 1200000000 HC SEMI PRIVATE

## 2025-03-09 PROCEDURE — 6360000002 HC RX W HCPCS: Performed by: INTERNAL MEDICINE

## 2025-03-09 PROCEDURE — 94669 MECHANICAL CHEST WALL OSCILL: CPT

## 2025-03-09 PROCEDURE — 36415 COLL VENOUS BLD VENIPUNCTURE: CPT

## 2025-03-09 PROCEDURE — 6360000002 HC RX W HCPCS: Performed by: NURSE PRACTITIONER

## 2025-03-09 RX ORDER — BISACODYL 5 MG/1
20 TABLET, DELAYED RELEASE ORAL ONCE
Status: COMPLETED | OUTPATIENT
Start: 2025-03-09 | End: 2025-03-09

## 2025-03-09 RX ORDER — POLYETHYLENE GLYCOL 3350 17 G/17G
238 POWDER, FOR SOLUTION ORAL ONCE
Status: COMPLETED | OUTPATIENT
Start: 2025-03-09 | End: 2025-03-09

## 2025-03-09 RX ORDER — POLYETHYLENE GLYCOL 3350 17 G/17G
120 POWDER, FOR SOLUTION ORAL ONCE
Status: COMPLETED | OUTPATIENT
Start: 2025-03-10 | End: 2025-03-10

## 2025-03-09 RX ADMIN — IPRATROPIUM BROMIDE 0.5 MG: 0.5 SOLUTION RESPIRATORY (INHALATION) at 08:02

## 2025-03-09 RX ADMIN — LEVOTHYROXINE SODIUM 50 MCG: 0.05 TABLET ORAL at 05:48

## 2025-03-09 RX ADMIN — IPRATROPIUM BROMIDE 0.5 MG: 0.5 SOLUTION RESPIRATORY (INHALATION) at 11:38

## 2025-03-09 RX ADMIN — SODIUM CHLORIDE, PRESERVATIVE FREE 10 ML: 5 INJECTION INTRAVENOUS at 10:25

## 2025-03-09 RX ADMIN — AMLODIPINE BESYLATE 5 MG: 5 TABLET ORAL at 10:24

## 2025-03-09 RX ADMIN — HEPARIN SODIUM 18 UNITS/KG/HR: 10000 INJECTION, SOLUTION INTRAVENOUS at 23:34

## 2025-03-09 RX ADMIN — IPRATROPIUM BROMIDE 0.5 MG: 0.5 SOLUTION RESPIRATORY (INHALATION) at 20:12

## 2025-03-09 RX ADMIN — PANTOPRAZOLE SODIUM 40 MG: 40 TABLET, DELAYED RELEASE ORAL at 05:48

## 2025-03-09 RX ADMIN — POLYETHYLENE GLYCOL 3350 238 G: 17 POWDER, FOR SOLUTION ORAL at 19:20

## 2025-03-09 RX ADMIN — IRON SUCROSE 200 MG: 20 INJECTION, SOLUTION INTRAVENOUS at 12:18

## 2025-03-09 RX ADMIN — CALCIUM 250 MG: 500 TABLET ORAL at 10:24

## 2025-03-09 RX ADMIN — IPRATROPIUM BROMIDE 0.5 MG: 0.5 SOLUTION RESPIRATORY (INHALATION) at 15:40

## 2025-03-09 RX ADMIN — BISACODYL 20 MG: 5 TABLET, COATED ORAL at 12:13

## 2025-03-09 RX ADMIN — PREDNISONE 5 MG: 5 TABLET ORAL at 10:24

## 2025-03-09 NOTE — PLAN OF CARE
Problem: Pain  Goal: Verbalizes/displays adequate comfort level or baseline comfort level  Outcome: Progressing  Flowsheets (Taken 3/8/2025 2259)  Verbalizes/displays adequate comfort level or baseline comfort level:   Encourage patient to monitor pain and request assistance   Assess pain using appropriate pain scale   Administer analgesics based on type and severity of pain and evaluate response   Implement non-pharmacological measures as appropriate and evaluate response     Problem: ABCDS Injury Assessment  Goal: Absence of physical injury  Outcome: Progressing     Problem: Safety - Adult  Goal: Free from fall injury  Outcome: Progressing

## 2025-03-10 ENCOUNTER — ANESTHESIA EVENT (OUTPATIENT)
Dept: ENDOSCOPY | Age: 89
DRG: 374 | End: 2025-03-10
Payer: MEDICARE

## 2025-03-10 ENCOUNTER — APPOINTMENT (OUTPATIENT)
Age: 89
DRG: 374 | End: 2025-03-10
Payer: MEDICARE

## 2025-03-10 ENCOUNTER — ANESTHESIA (OUTPATIENT)
Dept: ENDOSCOPY | Age: 89
DRG: 374 | End: 2025-03-10
Payer: MEDICARE

## 2025-03-10 LAB
ANTI-XA UNFRAC HEPARIN: 0.44 IU/ML (ref 0.3–0.7)
ECHO AO ASC DIAM: 2.8 CM
ECHO AO ASCENDING AORTA INDEX: 1.77 CM/M2
ECHO AO ROOT DIAM: 3 CM
ECHO AO ROOT INDEX: 1.9 CM/M2
ECHO AV AREA PEAK VELOCITY: 2 CM2
ECHO AV AREA VTI: 2 CM2
ECHO AV AREA/BSA PEAK VELOCITY: 1.3 CM2/M2
ECHO AV AREA/BSA VTI: 1.3 CM2/M2
ECHO AV MEAN GRADIENT: 4 MMHG
ECHO AV MEAN VELOCITY: 0.9 M/S
ECHO AV PEAK GRADIENT: 8 MMHG
ECHO AV PEAK VELOCITY: 1.4 M/S
ECHO AV VELOCITY RATIO: 0.64
ECHO AV VTI: 30.8 CM
ECHO BSA: 1.55 M2
ECHO EST RA PRESSURE: 3 MMHG
ECHO LA AREA 4C: 22.9 CM2
ECHO LA DIAMETER INDEX: 1.84 CM/M2
ECHO LA DIAMETER: 2.9 CM
ECHO LA MAJOR AXIS: 6.6 CM
ECHO LA TO AORTIC ROOT RATIO: 0.97
ECHO LA VOL MOD A4C: 62 ML (ref 18–58)
ECHO LA VOLUME INDEX MOD A4C: 39 ML/M2 (ref 16–34)
ECHO LV E' LATERAL VELOCITY: 4.66 CM/S
ECHO LV E' SEPTAL VELOCITY: 4.35 CM/S
ECHO LV EF PHYSICIAN: 55 %
ECHO LV FRACTIONAL SHORTENING: 28 % (ref 28–44)
ECHO LV INTERNAL DIMENSION DIASTOLE INDEX: 2.03 CM/M2
ECHO LV INTERNAL DIMENSION DIASTOLIC: 3.2 CM (ref 4.2–5.9)
ECHO LV INTERNAL DIMENSION SYSTOLIC INDEX: 1.46 CM/M2
ECHO LV INTERNAL DIMENSION SYSTOLIC: 2.3 CM
ECHO LV ISOVOLUMETRIC RELAXATION TIME (IVRT): 116 MS
ECHO LV IVSD: 0.8 CM (ref 0.6–1)
ECHO LV MASS 2D: 65.3 G (ref 88–224)
ECHO LV MASS INDEX 2D: 41.4 G/M2 (ref 49–115)
ECHO LV POSTERIOR WALL DIASTOLIC: 0.8 CM (ref 0.6–1)
ECHO LV RELATIVE WALL THICKNESS RATIO: 0.5
ECHO LVOT AREA: 3.1 CM2
ECHO LVOT AV VTI INDEX: 0.63
ECHO LVOT DIAM: 2 CM
ECHO LVOT MEAN GRADIENT: 2 MMHG
ECHO LVOT PEAK GRADIENT: 3 MMHG
ECHO LVOT PEAK VELOCITY: 0.9 M/S
ECHO LVOT STROKE VOLUME INDEX: 38.4 ML/M2
ECHO LVOT SV: 60.6 ML
ECHO LVOT VTI: 19.3 CM
ECHO MV A VELOCITY: 1.38 M/S
ECHO MV E DECELERATION TIME (DT): 391 MS
ECHO MV E VELOCITY: 0.83 M/S
ECHO MV E/A RATIO: 0.6
ECHO MV E/E' LATERAL: 17.81
ECHO MV E/E' RATIO (AVERAGED): 18.45
ECHO MV E/E' SEPTAL: 19.08
ECHO RV FREE WALL PEAK S': 10.6 CM/S
ECHO RV TAPSE: 1.9 CM (ref 1.7–?)

## 2025-03-10 PROCEDURE — 85520 HEPARIN ASSAY: CPT

## 2025-03-10 PROCEDURE — 88341 IMHCHEM/IMCYTCHM EA ADD ANTB: CPT

## 2025-03-10 PROCEDURE — 6370000000 HC RX 637 (ALT 250 FOR IP): Performed by: INTERNAL MEDICINE

## 2025-03-10 PROCEDURE — 36415 COLL VENOUS BLD VENIPUNCTURE: CPT

## 2025-03-10 PROCEDURE — 6360000002 HC RX W HCPCS: Performed by: NURSE ANESTHETIST, CERTIFIED REGISTERED

## 2025-03-10 PROCEDURE — 88305 TISSUE EXAM BY PATHOLOGIST: CPT

## 2025-03-10 PROCEDURE — 7100000011 HC PHASE II RECOVERY - ADDTL 15 MIN: Performed by: INTERNAL MEDICINE

## 2025-03-10 PROCEDURE — 2500000003 HC RX 250 WO HCPCS: Performed by: NURSE PRACTITIONER

## 2025-03-10 PROCEDURE — 1200000000 HC SEMI PRIVATE

## 2025-03-10 PROCEDURE — 2580000003 HC RX 258: Performed by: ANESTHESIOLOGY

## 2025-03-10 PROCEDURE — 2500000003 HC RX 250 WO HCPCS: Performed by: ANESTHESIOLOGY

## 2025-03-10 PROCEDURE — 88342 IMHCHEM/IMCYTCHM 1ST ANTB: CPT

## 2025-03-10 PROCEDURE — 6360000002 HC RX W HCPCS: Performed by: INTERNAL MEDICINE

## 2025-03-10 PROCEDURE — 3700000001 HC ADD 15 MINUTES (ANESTHESIA): Performed by: INTERNAL MEDICINE

## 2025-03-10 PROCEDURE — 2709999900 HC NON-CHARGEABLE SUPPLY: Performed by: INTERNAL MEDICINE

## 2025-03-10 PROCEDURE — 6370000000 HC RX 637 (ALT 250 FOR IP): Performed by: NURSE PRACTITIONER

## 2025-03-10 PROCEDURE — 3700000000 HC ANESTHESIA ATTENDED CARE: Performed by: INTERNAL MEDICINE

## 2025-03-10 PROCEDURE — 93306 TTE W/DOPPLER COMPLETE: CPT

## 2025-03-10 PROCEDURE — 2580000003 HC RX 258: Performed by: INTERNAL MEDICINE

## 2025-03-10 PROCEDURE — 0DBH8ZX EXCISION OF CECUM, VIA NATURAL OR ARTIFICIAL OPENING ENDOSCOPIC, DIAGNOSTIC: ICD-10-PCS | Performed by: INTERNAL MEDICINE

## 2025-03-10 PROCEDURE — 7100000010 HC PHASE II RECOVERY - FIRST 15 MIN: Performed by: INTERNAL MEDICINE

## 2025-03-10 PROCEDURE — 3609010300 HC COLONOSCOPY W/BIOPSY SINGLE/MULTIPLE: Performed by: INTERNAL MEDICINE

## 2025-03-10 PROCEDURE — 93306 TTE W/DOPPLER COMPLETE: CPT | Performed by: INTERNAL MEDICINE

## 2025-03-10 RX ORDER — SODIUM CHLORIDE 0.9 % (FLUSH) 0.9 %
5-40 SYRINGE (ML) INJECTION PRN
Status: DISCONTINUED | OUTPATIENT
Start: 2025-03-10 | End: 2025-03-10 | Stop reason: HOSPADM

## 2025-03-10 RX ORDER — BISACODYL 5 MG/1
10 TABLET, DELAYED RELEASE ORAL ONCE
Status: COMPLETED | OUTPATIENT
Start: 2025-03-10 | End: 2025-03-10

## 2025-03-10 RX ORDER — SODIUM CHLORIDE 0.9 % (FLUSH) 0.9 %
5-40 SYRINGE (ML) INJECTION EVERY 12 HOURS SCHEDULED
Status: DISCONTINUED | OUTPATIENT
Start: 2025-03-10 | End: 2025-03-10 | Stop reason: HOSPADM

## 2025-03-10 RX ORDER — PROPOFOL 10 MG/ML
INJECTION, EMULSION INTRAVENOUS
Status: DISCONTINUED | OUTPATIENT
Start: 2025-03-10 | End: 2025-03-10 | Stop reason: SDUPTHER

## 2025-03-10 RX ORDER — SODIUM CHLORIDE, SODIUM LACTATE, POTASSIUM CHLORIDE, CALCIUM CHLORIDE 600; 310; 30; 20 MG/100ML; MG/100ML; MG/100ML; MG/100ML
INJECTION, SOLUTION INTRAVENOUS CONTINUOUS
Status: DISCONTINUED | OUTPATIENT
Start: 2025-03-10 | End: 2025-03-10 | Stop reason: HOSPADM

## 2025-03-10 RX ORDER — LIDOCAINE HYDROCHLORIDE 20 MG/ML
INJECTION, SOLUTION INFILTRATION; PERINEURAL
Status: DISCONTINUED | OUTPATIENT
Start: 2025-03-10 | End: 2025-03-10 | Stop reason: SDUPTHER

## 2025-03-10 RX ORDER — SODIUM CHLORIDE 9 MG/ML
INJECTION, SOLUTION INTRAVENOUS PRN
Status: DISCONTINUED | OUTPATIENT
Start: 2025-03-10 | End: 2025-03-10 | Stop reason: HOSPADM

## 2025-03-10 RX ADMIN — CALCIUM 250 MG: 500 TABLET ORAL at 09:36

## 2025-03-10 RX ADMIN — POLYETHYLENE GLYCOL 3350 120 G: 17 POWDER, FOR SOLUTION ORAL at 03:41

## 2025-03-10 RX ADMIN — PROPOFOL 75 MCG/KG/MIN: 10 INJECTION, EMULSION INTRAVENOUS at 15:39

## 2025-03-10 RX ADMIN — SODIUM CHLORIDE: 0.9 INJECTION, SOLUTION INTRAVENOUS at 13:44

## 2025-03-10 RX ADMIN — PROPOFOL 20 MG: 10 INJECTION, EMULSION INTRAVENOUS at 15:44

## 2025-03-10 RX ADMIN — PANTOPRAZOLE SODIUM 40 MG: 40 TABLET, DELAYED RELEASE ORAL at 18:03

## 2025-03-10 RX ADMIN — AMLODIPINE BESYLATE 5 MG: 5 TABLET ORAL at 09:37

## 2025-03-10 RX ADMIN — BISACODYL 10 MG: 5 TABLET, COATED ORAL at 09:36

## 2025-03-10 RX ADMIN — FAMOTIDINE 20 MG: 10 INJECTION, SOLUTION INTRAVENOUS at 13:48

## 2025-03-10 RX ADMIN — PROPOFOL 10 MG: 10 INJECTION, EMULSION INTRAVENOUS at 15:41

## 2025-03-10 RX ADMIN — PROPOFOL 10 MG: 10 INJECTION, EMULSION INTRAVENOUS at 15:50

## 2025-03-10 RX ADMIN — SODIUM CHLORIDE, PRESERVATIVE FREE 10 ML: 5 INJECTION INTRAVENOUS at 09:37

## 2025-03-10 RX ADMIN — PROPOFOL 20 MG: 10 INJECTION, EMULSION INTRAVENOUS at 15:39

## 2025-03-10 RX ADMIN — LIDOCAINE HYDROCHLORIDE 60 MG: 20 INJECTION, SOLUTION INFILTRATION; PERINEURAL at 15:39

## 2025-03-10 RX ADMIN — PREDNISONE 5 MG: 5 TABLET ORAL at 09:37

## 2025-03-10 RX ADMIN — IRON SUCROSE 200 MG: 20 INJECTION, SOLUTION INTRAVENOUS at 09:41

## 2025-03-10 ASSESSMENT — PAIN - FUNCTIONAL ASSESSMENT: PAIN_FUNCTIONAL_ASSESSMENT: 0-10

## 2025-03-10 ASSESSMENT — PAIN SCALES - GENERAL
PAINLEVEL_OUTOF10: 0
PAINLEVEL_OUTOF10: 0

## 2025-03-10 NOTE — ANESTHESIA POSTPROCEDURE EVALUATION
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    Robert Phase I: Robert Score: 10    Robert Phase II: Robert Score: 10    Anesthesia Post Evaluation    Patient location during evaluation: PACU  Level of consciousness: awake  Airway patency: patent  Nausea & Vomiting: no vomiting  Cardiovascular status: blood pressure returned to baseline  Respiratory status: acceptable  Hydration status: stable  Pain management: adequate    No notable events documented.

## 2025-03-10 NOTE — ANESTHESIA PRE PROCEDURE
Department of Anesthesiology  Preprocedure Note       Name:  Marcos Barbosa   Age:  89 y.o.  :  1930                                          MRN:  [id number]         Date:  3/10/2025      Surgeon: Surgeon(s):  Tor Price DO    Procedure: Procedure(s):  COLONOSCOPY    Medications prior to admission:   Prior to Admission medications    Medication Sig Start Date End Date Taking? Authorizing Provider   ipratropium (ATROVENT) 0.02 % nebulizer solution Take 2.5 mLs by nebulization 4 times daily   Yes ProviderEmiliano MD   predniSONE (DELTASONE) 10 MG tablet Take 1 tablet by mouth daily   Yes Emiliano Foy MD   famotidine (PEPCID) 10 MG tablet Take 1 tablet by mouth 2 times daily   Yes ProviderEmiliano MD   losartan (COZAAR) 100 MG tablet Take 1 tablet by mouth daily   Yes ProviderEmiliano MD   amLODIPine (NORVASC) 5 MG tablet Take 1 tablet by mouth daily   Yes Emiliano Foy MD   simvastatin (ZOCOR) 20 MG tablet Take 1 tablet by mouth nightly   Yes ProviderEmiliano MD   calcium citrate (CALCITRATE) 950 (200 Ca) MG tablet Take 1 tablet by mouth daily   Yes ProviderEmiliano MD   levothyroxine (SYNTHROID) 50 MCG tablet Take 1 tablet by mouth Daily   Yes ProviderEmiliano MD   omeprazole (PRILOSEC) 20 MG delayed release capsule Take 2 capsules by mouth daily   Yes ProviderEmiliano MD       Current medications:    Current Facility-Administered Medications   Medication Dose Route Frequency Provider Last Rate Last Admin   • lactated ringers infusion   IntraVENous Continuous Werner Edouard MD       • sodium chloride flush 0.9 % injection 5-40 mL  5-40 mL IntraVENous 2 times per day Werner Edouard MD       • sodium chloride flush 0.9 % injection 5-40 mL  5-40 mL IntraVENous PRN Werner Edouard MD       • 0.9 % sodium chloride infusion   IntraVENous PRN Werner Edouard MD 50 mL/hr at 03/10/25 1344 New Bag at 03/10/25

## 2025-03-10 NOTE — H&P
Gastroenterology Preop Assessment    Patient:   Marcos Barbosa   :    1930   Facility:   [facility]  Referring/PCP: No primary care provider on file.  Date:     3/10/2025    Subjective:   Procedure: colonoscopy    HPI/Reason for procedure:  cecal mass    History reviewed. No pertinent past medical history.  History reviewed. No pertinent surgical history.    Social:   Social History     Tobacco Use    Smoking status: Not on file    Smokeless tobacco: Not on file   Substance Use Topics    Alcohol use: Not on file     Family: History reviewed. No pertinent family history.    Scheduled Medications:    sodium chloride flush  5-40 mL IntraVENous 2 times per day    predniSONE  5 mg Oral Daily    sodium chloride flush  5-40 mL IntraVENous 2 times per day    pantoprazole  40 mg Oral BID AC    amLODIPine  5 mg Oral Daily    levothyroxine  50 mcg Oral Daily    calcium elemental  250 mg Oral Daily       Current Medications:    Prior to Admission medications    Medication Sig Start Date End Date Taking? Authorizing Provider   ipratropium (ATROVENT) 0.02 % nebulizer solution Take 2.5 mLs by nebulization 4 times daily   Yes Provider, MD Emiliano   predniSONE (DELTASONE) 10 MG tablet Take 1 tablet by mouth daily   Yes Provider, MD Emiliano   famotidine (PEPCID) 10 MG tablet Take 1 tablet by mouth 2 times daily   Yes Provider, MD Emiliano   losartan (COZAAR) 100 MG tablet Take 1 tablet by mouth daily   Yes ProviderEmiliano MD   amLODIPine (NORVASC) 5 MG tablet Take 1 tablet by mouth daily   Yes Provider, MD Emiliano   simvastatin (ZOCOR) 20 MG tablet Take 1 tablet by mouth nightly   Yes ProviderEmiliano MD   calcium citrate (CALCITRATE) 950 (200 Ca) MG tablet Take 1 tablet by mouth daily   Yes ProviderEmiliano MD   levothyroxine (SYNTHROID) 50 MCG tablet Take 1 tablet by mouth Daily   Yes Provider, MD Emiliano   omeprazole (PRILOSEC) 20 MG delayed release capsule Take 2 
mouth Daily   Yes Provider, MD Emiliano   omeprazole (PRILOSEC) 20 MG delayed release capsule Take 2 capsules by mouth daily   Yes Provider, MD Emiliano       Labs: Personally reviewed and interpreted for clinical significance.   Recent Labs     03/06/25  1544   WBC 18.6*   HGB 9.5*   HCT 31.8*        Recent Labs     03/06/25  1544      K 5.0      CO2 21   BUN 40*   CREATININE 1.7*   CALCIUM 10.6     Recent Labs     03/06/25  1544 03/06/25  1640   TROPHS 47* 44*     Recent Labs     03/06/25  1544   AST 51*   ALT 29   BILITOT 0.4   ALKPHOS 248*     Anticipated co-signer, Dr. Kobe Damon, APRN - CNP

## 2025-03-10 NOTE — CARE COORDINATION
Chart reviewed day 4. Care managed by [facility], GI and IM, pulm signed off. Palliative consult pending. Plan for colonoscopy today. Dosed w venofer. ECHO pending, no therapy input noted. [facility] [place] [facility] with wife. Using a walker for past week. Brinda Loza RN

## 2025-03-11 LAB
AFP-TM SERPL-MCNC: 2.5 UG/L
ANTI-XA UNFRAC HEPARIN: 0.19 IU/ML (ref 0.3–0.7)
ANTI-XA UNFRAC HEPARIN: <0.1 IU/ML (ref 0.3–0.7)

## 2025-03-11 PROCEDURE — 36415 COLL VENOUS BLD VENIPUNCTURE: CPT

## 2025-03-11 PROCEDURE — 6360000002 HC RX W HCPCS: Performed by: STUDENT IN AN ORGANIZED HEALTH CARE EDUCATION/TRAINING PROGRAM

## 2025-03-11 PROCEDURE — 6370000000 HC RX 637 (ALT 250 FOR IP): Performed by: NURSE PRACTITIONER

## 2025-03-11 PROCEDURE — 6360000002 HC RX W HCPCS: Performed by: INTERNAL MEDICINE

## 2025-03-11 PROCEDURE — 6370000000 HC RX 637 (ALT 250 FOR IP): Performed by: INTERNAL MEDICINE

## 2025-03-11 PROCEDURE — 1200000000 HC SEMI PRIVATE

## 2025-03-11 PROCEDURE — 85520 HEPARIN ASSAY: CPT

## 2025-03-11 RX ORDER — ENOXAPARIN SODIUM 100 MG/ML
1 INJECTION SUBCUTANEOUS DAILY
Status: DISCONTINUED | OUTPATIENT
Start: 2025-03-11 | End: 2025-03-12

## 2025-03-11 RX ORDER — ENOXAPARIN SODIUM 100 MG/ML
1.5 INJECTION SUBCUTANEOUS DAILY
Status: DISCONTINUED | OUTPATIENT
Start: 2025-03-11 | End: 2025-03-11 | Stop reason: DRUGHIGH

## 2025-03-11 RX ADMIN — PREDNISONE 5 MG: 5 TABLET ORAL at 09:14

## 2025-03-11 RX ADMIN — ENOXAPARIN SODIUM 50 MG: 100 INJECTION SUBCUTANEOUS at 09:15

## 2025-03-11 RX ADMIN — CALCIUM 250 MG: 500 TABLET ORAL at 09:15

## 2025-03-11 RX ADMIN — LEVOTHYROXINE SODIUM 50 MCG: 0.05 TABLET ORAL at 05:36

## 2025-03-11 RX ADMIN — HEPARIN SODIUM 2100 UNITS: 1000 INJECTION INTRAVENOUS; SUBCUTANEOUS at 00:46

## 2025-03-11 RX ADMIN — PANTOPRAZOLE SODIUM 40 MG: 40 TABLET, DELAYED RELEASE ORAL at 05:36

## 2025-03-11 RX ADMIN — AMLODIPINE BESYLATE 5 MG: 5 TABLET ORAL at 09:14

## 2025-03-11 RX ADMIN — PANTOPRAZOLE SODIUM 40 MG: 40 TABLET, DELAYED RELEASE ORAL at 16:25

## 2025-03-11 RX ADMIN — Medication 1 LOZENGE: at 21:42

## 2025-03-11 NOTE — CARE COORDINATION
Chart reviewed day 5. Patient family spoke with palliative care yesterday with referral to HOC completed. Message left for liaison Maite for update on meeting time. Spoke with Ava [phone] at [place]. Stated patient can return to [facility] with \[facility]\"" for LTC/HOC services. Brinda Loza RN

## 2025-03-11 NOTE — PLAN OF CARE
Problem: Pain  Goal: Verbalizes/displays adequate comfort level or baseline comfort level  Outcome: Progressing     Problem: Safety - Adult  Goal: Free from fall injury  Outcome: Progressing     Problem: Skin/Tissue Integrity  Goal: Skin integrity remains intact  Description: 1.  Monitor for areas of redness and/or skin breakdown  2.  Assess vascular access sites hourly  3.  Every 4-6 hours minimum:  Change oxygen saturation probe site  4.  Every 4-6 hours:  If on nasal continuous positive airway pressure, respiratory therapy assess nares and determine need for appliance change or resting period  Outcome: Progressing     Problem: Respiratory - Adult  Goal: Achieves optimal ventilation and oxygenation  Outcome: Progressing

## 2025-03-11 NOTE — PLAN OF CARE
Problem: Pain  Goal: Verbalizes/displays adequate comfort level or baseline comfort level  3/11/2025 1000 by Nuha Martin RN  Outcome: Progressing  Flowsheets (Taken 3/8/2025 2259 by Mary Mosqueda, RN)  Verbalizes/displays adequate comfort level or baseline comfort level:   Encourage patient to monitor pain and request assistance   Assess pain using appropriate pain scale   Administer analgesics based on type and severity of pain and evaluate response   Implement non-pharmacological measures as appropriate and evaluate response  3/11/2025 0618 by Lorraine Isbell RN  Outcome: Progressing     Problem: ABCDS Injury Assessment  Goal: Absence of physical injury  Outcome: Progressing  Flowsheets (Taken 3/11/2025 1000)  Absence of Physical Injury: Implement safety measures based on patient assessment     Problem: Safety - Adult  Goal: Free from fall injury  3/11/2025 1000 by Nuha Martin RN  Outcome: Progressing  Flowsheets (Taken 3/11/2025 1000)  Free From Fall Injury:   Instruct family/caregiver on patient safety   Based on caregiver fall risk screen, instruct family/caregiver to ask for assistance with transferring infant if caregiver noted to have fall risk factors  3/11/2025 0618 by Lorraine Isbell, RN  Outcome: Progressing     Problem: Nutrition Deficit:  Goal: Optimize nutritional status  Outcome: Progressing  Flowsheets (Taken 3/11/2025 1000)  Nutrient intake appropriate for improving, restoring, or maintaining nutritional needs:   Assess nutritional status and recommend course of action   Monitor oral intake, labs, and treatment plans   Recommend appropriate diets, oral nutritional supplements, and vitamin/mineral supplements     Problem: Skin/Tissue Integrity  Goal: Skin integrity remains intact  Description: 1.  Monitor for areas of redness and/or skin breakdown  2.  Assess vascular access sites hourly  3.  Every 4-6 hours minimum:  Change oxygen saturation probe site  4.  Every 4-6 hours:  If on nasal

## 2025-03-11 NOTE — CARE COORDINATION
[facility] complete Document ID : [id number]     Chart marked for merge .[phone]  .ROYAL Hou

## 2025-03-11 NOTE — CARE COORDINATION
Spoke with Maite with [facility]. Stated [facility] can accept tomorrow. Transport arranged at 1pm will need orders by noon. Will need paper scripts and completed lexus. Family updated on above. Brinda Loza RN

## 2025-03-12 VITALS
TEMPERATURE: 97.3 F | RESPIRATION RATE: 18 BRPM | OXYGEN SATURATION: 92 % | WEIGHT: 114.1 LBS | DIASTOLIC BLOOD PRESSURE: 69 MMHG | HEART RATE: 107 BPM | HEIGHT: 66 IN | SYSTOLIC BLOOD PRESSURE: 118 MMHG | BODY MASS INDEX: 18.34 KG/M2

## 2025-03-12 PROCEDURE — 6360000002 HC RX W HCPCS: Performed by: INTERNAL MEDICINE

## 2025-03-12 PROCEDURE — 6370000000 HC RX 637 (ALT 250 FOR IP): Performed by: NURSE PRACTITIONER

## 2025-03-12 PROCEDURE — 6370000000 HC RX 637 (ALT 250 FOR IP): Performed by: INTERNAL MEDICINE

## 2025-03-12 PROCEDURE — 2700000000 HC OXYGEN THERAPY PER DAY

## 2025-03-12 PROCEDURE — 94761 N-INVAS EAR/PLS OXIMETRY MLT: CPT

## 2025-03-12 RX ORDER — LORAZEPAM 0.5 MG/1
0.5 TABLET ORAL EVERY 8 HOURS PRN
Status: DISCONTINUED | OUTPATIENT
Start: 2025-03-12 | End: 2025-03-12 | Stop reason: HOSPADM

## 2025-03-12 RX ORDER — MORPHINE SULFATE 20 MG/ML
5 SOLUTION ORAL EVERY 4 HOURS PRN
Refills: 0 | Status: DISCONTINUED | OUTPATIENT
Start: 2025-03-12 | End: 2025-03-12 | Stop reason: HOSPADM

## 2025-03-12 RX ORDER — PREDNISONE 5 MG/1
5 TABLET ORAL DAILY
Qty: 30 TABLET | Refills: 0
Start: 2025-03-13

## 2025-03-12 RX ORDER — MORPHINE SULFATE 20 MG/ML
5 SOLUTION ORAL EVERY 4 HOURS PRN
Qty: 30 ML | Refills: 0 | Status: SHIPPED | OUTPATIENT
Start: 2025-03-12 | End: 2025-04-01

## 2025-03-12 RX ORDER — LORAZEPAM 0.5 MG/1
0.5 TABLET ORAL EVERY 8 HOURS PRN
Qty: 30 TABLET | Refills: 0 | Status: SHIPPED | OUTPATIENT
Start: 2025-03-12 | End: 2025-03-22

## 2025-03-12 RX ADMIN — LEVOTHYROXINE SODIUM 50 MCG: 0.05 TABLET ORAL at 09:17

## 2025-03-12 RX ADMIN — PANTOPRAZOLE SODIUM 40 MG: 40 TABLET, DELAYED RELEASE ORAL at 09:17

## 2025-03-12 RX ADMIN — PREDNISONE 5 MG: 5 TABLET ORAL at 09:16

## 2025-03-12 RX ADMIN — APIXABAN 10 MG: 5 TABLET, FILM COATED ORAL at 12:01

## 2025-03-12 RX ADMIN — CALCIUM 250 MG: 500 TABLET ORAL at 09:17

## 2025-03-12 RX ADMIN — AMLODIPINE BESYLATE 5 MG: 5 TABLET ORAL at 09:16

## 2025-03-12 NOTE — DISCHARGE INSTR - COC
Continuity of Care Form    Patient Name: Marcos Barbosa   :  1930  MRN:  [id number]    Admit date:  3/6/2025  Discharge date:  ***    Code Status Order: DNR-CC   Advance Directives:    Date/Time Healthcare Directive Type of Healthcare Directive Copy in Chart Healthcare Agent Appointed Healthcare Agent's Name Healthcare Agent's Phone Number    03/10/25 1333 Yes, patient has an advance directive for healthcare treatment  Living will  --  --  --  --             Admitting Physician:  Tho Oh MD  PCP: No primary care provider on file.    Discharging Nurse: ***  Discharging Hospital Unit/Room#: 0348/0348-01  Discharging Unit Phone Number: ***    Emergency Contact:   Extended Emergency Contact Information  Primary Emergency Contact: Marsha Barbosa  Home Phone: [phone]  Mobile Phone: [phone]  Relation: Spouse  Secondary Emergency Contact: Marcos Barbosa  Home Phone: [phone]  Relation: Child    Past Surgical History:  Past Surgical History:   Procedure Laterality Date    COLONOSCOPY N/A 3/10/2025    COLONOSCOPY BIOPSY performed by Tor Price DO at [facility] ENDOSCOPY       Immunization History:     There is no immunization history on file for this patient.    Active Problems:  Patient Active Problem List   Diagnosis Code    Pulmonary embolus, left (HCC) I26.99       Isolation/Infection:   Isolation            No Isolation          Patient Infection Status    None to display              Nurse Assessment:  Last Vital Signs: /69   Pulse (!) 107   Temp 97.3 °F (36.3 °C) (Oral)   Resp 18   Ht 1.676 m (5' 5.98\")   Wt 51.8 kg (114 lb 1.6 oz)   SpO2 92%   BMI 18.43 kg/m²     Last documented pain score (0-10 scale): Pain Level: 0  Last Weight:   Wt Readings from Last 1 Encounters:   25 51.8 kg (114 lb 1.6 oz)     Mental Status:  {IP PT MENTAL STATUS:}    IV Access:  {[facility] [place] [facility] IV ACCESS:[id number]}    Nursing Mobility/ADLs:  Walking   {[facility] DME

## 2025-03-12 NOTE — PROGRESS NOTES
PROGRESS NOTE    HPI: Marcos Barbosa is a(n)89 y.o. male admitted for work-up and treatment for Pulmonary embolus, left (HCC) [I26.99]  Metastatic colon cancer to liver (HCC) [C18.9, C78.7]  Acute deep vein thrombosis (DVT) of proximal vein of right lower extremity (HCC) [I82.4Y1]  Other acute pulmonary embolism without acute cor pulmonale (HCC) [I26.99].     We are following for cecal mass.    Subjective:     He does not have much appetite.  No acute events overnight.  Patient met with hospice today and plans are to discharge later today or tomorrow.      Objective:     I/O last 3 completed shifts:  In: 661.9 [I.V.:661.9]  Out: 402 [Urine:400; Stool:2]      /76   Pulse 85   Temp 97.5 °F (36.4 °C) (Oral)   Resp 18   Ht 1.676 m (5' 5.98\")   Wt 51.8 kg (114 lb 1.6 oz)   SpO2 100%   BMI 18.43 kg/m²     Physical Exam:  HEENT: anicteric sclera, oropharyngeal membranes pink and moist.  Cor: RRR  Lungs: slightly labored at rest.  Abdomen: soft,  NT. No ascites.  No hepatomegaly or splenomegaly  Extremities: no edema  Neuro: alert and oriented x 3      Results:   Lab Results   Component Value Date    ALT 27 03/09/2025    AST 66 (H) 03/09/2025    ALKPHOS 259 (H) 03/09/2025     Lab Results   Component Value Date    WBC 16.0 (H) 03/09/2025    HGB 8.7 (L) 03/09/2025    HCT 29.1 (L) 03/09/2025    MCV 77.9 (L) 03/09/2025     03/09/2025     BUN/Cr/glu/ALT/AST/amyl/lip:  27/1.2/--/27/66/--/-- (03/09 0543)  CT ABDOMEN PELVIS W IV CONTRAST Additional Contrast? None  Result Date: 3/6/2025  1. Extensive hepatic metastatic disease. 2. Mass in the region of the cecum most consistent with primary colon cancer. 3. Suspected deep venous thrombosis in the right common femoral vein. 4. Extensive diverticulosis without diverticulitis. 5. Gallbladder distention without acute inflammation identified. Cholecystitis cannot be excluded. Electronically signed by Alexis 
                                      ONCOLOGY HEMATOLOGY CARE PROGRESS NOTE      SUBJECTIVE:      ROS:     Constitutional:  No weight loss, No fever, No chills, No night sweats.  Energy level good.  Eyes:  No impairment or change in vision  ENT / Mouth:  No pain, abnormal ulceration, bleeding, nasal drip or change in voice or hearing  Cardiovascular:  No chest pain, palpitations, new edema, or calf discomfort  Respiratory:  No pain, hemoptysis, change to breathing  Breast:  No pain, discharge, change in appearance or texture  Gastrointestinal:  No pain, cramping, jaundice, change to eating and bowel habits  Urinary:  No pain, bleeding or change in continence  Genitalia: No pain, bleeding or discharge  Musculoskeletal:  No redness, pain, edema or weakness  Skin:  No pruritus, rash, change to nodules or lesions  Neurologic:  No discomfort, change in mental status, speech, sensory or motor activity  Psychiatric:  No change in concentration or change to affect or mood  Endocrine:  No hot flashes, increased thirst, or change to urine production  Hematologic: No petechiae, ecchymosis or bleeding  Lymphatic:  No lymphadenopathy or lymphedema  Allergy / Immunologic:  No eczema, hives, frequent or recurrent infections    OBJECTIVE        Physical    VITALS:  Patient Vitals for the past 24 hrs:   BP Temp Temp src Pulse Resp SpO2 Height   03/08/25 0327 101/75 97.7 °F (36.5 °C) Oral 81 16 99 % --   03/08/25 0017 116/70 98.1 °F (36.7 °C) Oral 79 16 98 % --   03/07/25 2031 113/74 98.2 °F (36.8 °C) Oral 82 18 96 % --   03/07/25 1943 -- -- -- 86 16 97 % --   03/07/25 1540 114/74 98.6 °F (37 °C) Oral 88 16 98 % --   03/07/25 1346 -- -- -- -- -- -- 1.676 m (5' 5.98\")   03/07/25 1258 -- -- -- -- -- 97 % --   03/07/25 1121 112/74 97.2 °F (36.2 °C) Oral 77 16 100 % --   03/07/25 0855 -- -- -- -- -- 98 % --       24HR INTAKE/OUTPUT:    Intake/Output Summary (Last 24 hours) at 3/8/2025 4161  Last data filed at 3/8/2025 7217  Gross per 24 
                                     PROGRESS NOTE  S:89 yrs Patient  admitted on 3/6/2025 with Pulmonary embolus, left (HCC) [I26.99]  Metastatic colon cancer to liver (HCC) [C18.9, C78.7]  Acute deep vein thrombosis (DVT) of proximal vein of right lower extremity (HCC) [I82.4Y1]  Other acute pulmonary embolism without acute cor pulmonale (HCC) [I26.99] .  Today he complains of Abdominal Pain in the lower quadrants. Tolerating liquid diet    Exam:   Vitals:    03/09/25 1025   BP: 118/66   Pulse: 93   Resp: 16   Temp: 97.1 °F (36.2 °C)   SpO2: 91%     General: alert, appears stated age, and cooperative  HEENT: Sclera clear, anicteric  Neck: supple, symmetrical, trachea midline  Heart: regular rate and rhythm  Abdomen:  soft, NT, ND  Extremities:  no edema    Medications: Reviewed    Labs:  CBC:   Recent Labs     03/07/25  0200 03/08/25  0644 03/09/25  0543   WBC 15.5* 16.0* 16.0*   HGB 8.6* 9.4* 8.7*   HCT 28.5* 31.1* 29.1*   MCV 78.2* 77.9* 77.9*    389 390     BMP:   Recent Labs     03/07/25  0200 03/08/25  0644 03/09/25  0543    141 139   K 4.4 3.9 4.1    103 103   CO2 21 22 24   BUN 36* 29* 27*   CREATININE 1.5* 1.3 1.2     LIVER PROFILE:   Recent Labs     03/07/25  0200 03/08/25  0644 03/09/25  0543   AST 44* 67* 66*   ALT 21 28 27   BILITOT 0.4 0.5 0.6   ALKPHOS 217* 257* 259*     Impression: 89-year-old male with history of HTN, HLD, CKD, pulmonary fibrosis presented with acute DVTs/PE and cecal mass.     Recommendation:  Continue supportive care.   Continue clear liquid diet  Await CEA  Monitor hemoglobin  Start colon prep today  Colonoscopy tentatively scheduled for tomorrow      Joss Pollard MD, MD  11:32 AM 3/9/2025                       
                                     PROGRESS NOTE  S:89 yrs Patient  admitted on 3/6/2025 with Pulmonary embolus, left (HCC) [I26.99]  Metastatic colon cancer to liver (HCC) [C18.9, C78.7]  Acute deep vein thrombosis (DVT) of proximal vein of right lower extremity (HCC) [I82.4Y1]  Other acute pulmonary embolism without acute cor pulmonale (HCC) [I26.99] .  Today he feels well. No complains of Constipation and Abdominal Pain    Exam:   Vitals:    03/08/25 0830   BP: 116/70   Pulse: 83   Resp: 16   Temp: 97.3 °F (36.3 °C)   SpO2: 99%      General: alert, appears stated age, and cooperative  HEENT: Sclera clear, anicteric  Neck: supple, symmetrical, trachea midline  Heart: regular rate and rhythm  Abdomen:  soft, NT, ND  Extremities:  no edema      Medications: Reviewed    Labs:  CBC:   Recent Labs     03/06/25  1544 03/07/25  0200 03/08/25  0644   WBC 18.6* 15.5* 16.0*   HGB 9.5* 8.6* 9.4*   HCT 31.8* 28.5* 31.1*   MCV 78.0* 78.2* 77.9*    343 389     BMP:   Recent Labs     03/06/25  1544 03/07/25  0200 03/08/25  0644    140 141   K 5.0 4.4 3.9    105 103   CO2 21 21 22   BUN 40* 36* 29*   CREATININE 1.7* 1.5* 1.3     LIVER PROFILE:   Recent Labs     03/06/25  1544 03/07/25  0200 03/08/25  0644   AST 51* 44* 67*   ALT 29 21 28   BILITOT 0.4 0.4 0.5   ALKPHOS 248* 217* 257*       Impression: 89-year-old male with history of HTN, HLD, CKD, pulmonary fibrosis presented with acute DVTs/PE and cecal mass.    Recommendation:  Continue supportive care.   Continue clear liquid diet  Await CEA  Monitor hemoglobin  Start colon prep tomorrow  Colonoscopy tentatively scheduled for Monday      Joss Pollard MD, MD  11:19 AM 3/8/2025                       
  4 Eyes Skin Assessment and Patient belongings     The patient is being assess for  Admission    I agree that 2 Nurses have performed a thorough Head to Toe Skin Assessment on the patient. ALL assessment sites listed below have been assessed.       Areas assessed by both nurses: Phillip CASTRO / Carly CASTRO  [x]   Head, Face, and Ears   [x]   Shoulders, Back, and Chest  [x]   Arms, Elbows, and Hands   [x]   Coccyx, Sacrum, and IschIum  [x]   Legs, Feet, and Heels        Does the Patient have Skin Breakdown?  No         Aron Prevention initiated:  No   Wound Care Orders initiated:  No      [facility] nurse consulted for Pressure Injury (Stage 3,4, Unstageable, DTI, NWPT, and Complex wounds), New and Established Ostomies:  No      I agree that 2 Nurses have reviewed patient belongings with the patient/family and documented in the flowsheet upon admission or transfer to the unit.     Belongings  Dental Appliances: None  Vision - Corrective Lenses: Eyeglasses  Hearing Aid: Left hearing aid, Right hearing aid  Clothing: Pants, Shirt, Bathrobe, Footwear, Socks, Sweater, Undergarments  Jewelry: None  Electronic Devices: None  Weapons (Notify Protective Services/Security): None  Home Medications: None  Valuables Given To: Patient  Provide Name(s) of Who Valuable(s) Were Given To: Mr Rodríguez       Nurse 1 eSignature: Electronically signed by Phillip Godfrey RN on 3/7/25 at 7:00 AM EST    **SHARE this note so that the co-signing nurse is able to place an eSignature**    Nurse 2 eSignature: Electronically signed by Carly Rodriguez RN on 3/7/25 at 7:01 AM EST  
  Hospital Medicine Progress Note  V 1.6      Date of Admission: 3/6/2025    Hospital Day: 2      Chief Admission Complaint:  \"Shortness of breath \"    Subjective:  no new c/o    Presenting Admission History:       \"Marcos Barbosa is a/an 89 y.o. male with a significant past medical history of hypertension, hyperlipidemia, CKD, pulmonary fibromyalgia who presents to [facility]'s emergency department from his PCPs office after being seen here today for follow-up for ongoing dyspnea and abdominal pain.  Patient notes that his PCP was concerned that he may be developing pneumonia and dehydration she was sent here.  Patient notes the reason for his follow-up was to follow-up on what he thinks was a month-long course of steroids for ongoing abdominal pain?  Discharge is fragments at the moment with a pending emerge of 2 charts but I do not see any office visit note from today.  He also notes that he was recently seen by pulmonologist and was diagnosed with pulmonary fibrosis, which I also do not see a need for.  Nonetheless, patient's evaluation here included laboratory studies, EKG, chest x-ray, CT PE protocol, and CT chest/abdomen/pelvis with IV contrast.  Chest x-ray showed coarse interstitial markings in the lung periphery predominantly in the bases.  CT PE protocol showed segmental pulmonary thromboembolism in the left lower lung without evidence of RV strain.  CT abdomen pelvis showed extensive hepatic metastatic disease; mass in the region of the cecum most consistent with primary colon cancer, suspected DVT in the right CFA.  Laboratory studies reviewed and pertinent for BUN 40, creatinine 1.7, anion gap 18, blood glucose 156, troponin 47 with repeat of 44, , AST 51, WBC 18.6, hemoglobin 9.5, and D-dimer greater than 20.  Patient was started on heparin infusion.  Hospital team consulted to admit \"    Assessment/Plan:      Current Principal Problem:  Pulmonary embolus, left 
  Hospital Medicine Progress Note  V 1.6      Date of Admission: 3/6/2025    Hospital Day: 3      Chief Admission Complaint:  \"Shortness of breath \"    Subjective:  no new c/o    Presenting Admission History:       \"Marcos Barbosa is a/an 89 y.o. male with a significant past medical history of hypertension, hyperlipidemia, CKD, pulmonary fibromyalgia who presents to [facility]'s emergency department from his PCPs office after being seen here today for follow-up for ongoing dyspnea and abdominal pain.  Patient notes that his PCP was concerned that he may be developing pneumonia and dehydration she was sent here.  Patient notes the reason for his follow-up was to follow-up on what he thinks was a month-long course of steroids for ongoing abdominal pain?  Discharge is fragments at the moment with a pending emerge of 2 charts but I do not see any office visit note from today.  He also notes that he was recently seen by pulmonologist and was diagnosed with pulmonary fibrosis, which I also do not see a need for.  Nonetheless, patient's evaluation here included laboratory studies, EKG, chest x-ray, CT PE protocol, and CT chest/abdomen/pelvis with IV contrast.  Chest x-ray showed coarse interstitial markings in the lung periphery predominantly in the bases.  CT PE protocol showed segmental pulmonary thromboembolism in the left lower lung without evidence of RV strain.  CT abdomen pelvis showed extensive hepatic metastatic disease; mass in the region of the cecum most consistent with primary colon cancer, suspected DVT in the right CFA.  Laboratory studies reviewed and pertinent for BUN 40, creatinine 1.7, anion gap 18, blood glucose 156, troponin 47 with repeat of 44, , AST 51, WBC 18.6, hemoglobin 9.5, and D-dimer greater than 20.  Patient was started on heparin infusion.  Hospital team consulted to admit \"    Assessment/Plan:      Current Principal Problem:  Pulmonary embolus, left 
  Hospital Medicine Progress Note  V 1.6      Date of Admission: 3/6/2025    Hospital Day: 5        Chief Admission Complaint:  \"Shortness of breath \"     Subjective: No new symptoms. Aware of gi procedure today, currently no family at bedside     Presenting Admission History:        \"Marcos Barbosa is a/an 89 y.o. male with a significant past medical history of hypertension, hyperlipidemia, CKD, pulmonary fibromyalgia who presents to [facility]'s emergency department from his PCPs office after being seen here today for follow-up for ongoing dyspnea and abdominal pain.  Patient notes that his PCP was concerned that he may be developing pneumonia and dehydration she was sent here.  Patient notes the reason for his follow-up was to follow-up on what he thinks was a month-long course of steroids for ongoing abdominal pain?  Discharge is fragments at the moment with a pending emerge of 2 charts but I do not see any office visit note from today.  He also notes that he was recently seen by pulmonologist and was diagnosed with pulmonary fibrosis, which I also do not see a need for.  Nonetheless, patient's evaluation here included laboratory studies, EKG, chest x-ray, CT PE protocol, and CT chest/abdomen/pelvis with IV contrast.  Chest x-ray showed coarse interstitial markings in the lung periphery predominantly in the bases.  CT PE protocol showed segmental pulmonary thromboembolism in the left lower lung without evidence of RV strain.  CT abdomen pelvis showed extensive hepatic metastatic disease; mass in the region of the cecum most consistent with primary colon cancer, suspected DVT in the right CFA.  Laboratory studies reviewed and pertinent for BUN 40, creatinine 1.7, anion gap 18, blood glucose 156, troponin 47 with repeat of 44, , AST 51, WBC 18.6, hemoglobin 9.5, and D-dimer greater than 20.  Patient was started on heparin infusion.  Hospital team consulted to admit \"     Assessment/Plan:     
  Hospital Medicine Progress Note  V 1.6      Date of Admission: 3/6/2025    Hospital Day: 6      Chief Admission Complaint:  \"Shortness of breath \"     Subjective: No new symptoms.  Lost iv access overnight, currently no family at bedside     Presenting Admission History:        \"Marcos Barbosa is a/an 89 y.o. male with a significant past medical history of hypertension, hyperlipidemia, CKD, pulmonary fibromyalgia who presents to [facility]'s emergency department from his PCPs office after being seen here today for follow-up for ongoing dyspnea and abdominal pain.  Patient notes that his PCP was concerned that he may be developing pneumonia and dehydration she was sent here.  Patient notes the reason for his follow-up was to follow-up on what he thinks was a month-long course of steroids for ongoing abdominal pain?  Discharge is fragments at the moment with a pending emerge of 2 charts but I do not see any office visit note from today.  He also notes that he was recently seen by pulmonologist and was diagnosed with pulmonary fibrosis, which I also do not see a need for.  Nonetheless, patient's evaluation here included laboratory studies, EKG, chest x-ray, CT PE protocol, and CT chest/abdomen/pelvis with IV contrast.  Chest x-ray showed coarse interstitial markings in the lung periphery predominantly in the bases.  CT PE protocol showed segmental pulmonary thromboembolism in the left lower lung without evidence of RV strain.  CT abdomen pelvis showed extensive hepatic metastatic disease; mass in the region of the cecum most consistent with primary colon cancer, suspected DVT in the right CFA.  Laboratory studies reviewed and pertinent for BUN 40, creatinine 1.7, anion gap 18, blood glucose 156, troponin 47 with repeat of 44, , AST 51, WBC 18.6, hemoglobin 9.5, and D-dimer greater than 20.  Patient was started on heparin infusion.  Hospital team consulted to admit \"     Assessment/Plan:     
Ambulated down hallway about 100 feet and patient became extremely winded. Once back to room he was 72% on RA. Placed on 5L until up to 100%. Left on 2L for comfort.  
Attempted multiple times to ambulate with patient. He continued to deny. He states he just wants to be with family and then to be alone.  
Comprehensive Nutrition Assessment    Type and Reason for Visit:  Initial, Positive nutrition screen (BMI)    Nutrition Recommendations/Plan:   Continue low fiber diet as tolerated.  Encourage PO intakes.  Ensure TID.   Monitor pertinent labs, bowel habits, weight, N/V, supplement acceptance, clinical progression.       Malnutrition Assessment:  Malnutrition Status:  At risk for malnutrition (03/07/25 1356)    Context:  Acute Illness     Findings of the 6 clinical characteristics of malnutrition:  Energy Intake:  Unable to assess  Weight Loss:  Unable to assess (no weight hx per EMR)     Body Fat Loss:  Unable to assess     Muscle Mass Loss:  Unable to assess    Fluid Accumulation:  No fluid accumulation     Strength:  Not Performed    Nutrition Assessment:    Patient seen due to positive nutrition screen for weight loss and decreased appetite, and BMI <18.5. Admitted for Left Lung Pulmonary Embolism, Colorectal Cancer with Hepatic Metastatic Disease. PMHx of hypertension, hyperlipidemia, CKD, pulmonary fibromyalgia. Currently on low fiber diet. 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 Per GI note, colonscopy planned for Monday 3/10. Limited weight history per EMR. However, per GI note pt reports weight loss. Pt receiving Ensure ONS TID to better meet nutrient needs. Will continue to monitor.    Nutrition Related Findings:    LBM 3/4. Synthroid. Wound Type: None       Current Nutrition Intake & Therapies:    Average Meal Intake: Unable to assess  Average Supplements Intake: Unable to assess  ADULT ORAL NUTRITION SUPPLEMENT; Breakfast, Lunch, Dinner; Standard High Calorie/High Protein Oral Supplement  ADULT DIET; Regular; Low Fiber  ADULT DIET; Clear Liquid    Anthropometric Measures:  Height: 167.6 cm (5' 5.98\")  Ideal Body Weight (IBW): 142 lbs (65 kg)       Current Body Weight: 51.8 kg (114 lb 3.2 oz) (3/6/25), 80.4 % IBW. Weight Source: Not specified  Current BMI (kg/m2): 18.4           Weight 
Comprehensive Nutrition Assessment    Type and Reason for Visit:  Reassess    Nutrition Recommendations/Plan:   Diet advancement per MD.  Encourage PO intakes as tolerated.   Ensure clear once/day.  Monitor pertinent labs, bowel habits, weight, N/V, supplement acceptance, clinical progression.       Malnutrition Assessment:  Malnutrition Status:  At risk for malnutrition (03/07/25 1356)    Context:  Acute Illness     Findings of the 6 clinical characteristics of malnutrition:  Energy Intake:  Unable to assess  Weight Loss:  Unable to assess (no weight hx per EMR)     Body Fat Loss:  Unable to assess     Muscle Mass Loss:  Unable to assess    Fluid Accumulation:  No fluid accumulation     Strength:  Not Performed    Nutrition Assessment:    Followup: Patient s/p colonoscopy 3/10 - probable metastatic mass on cecum, several polyps per GI note. Currently on clear liquid diet. PO intakes of 0-50% per flowsheets. Will send Ensure Clear once/day to better meet nutrient needs. Per chart review, patient met with hospice today and plans are to discharge later today or tomorrow. Will continue to monitor.    Nutrition Related Findings:    LBM 3/10 - bowel prep. Wound Type: None       Current Nutrition Intake & Therapies:    Average Meal Intake: 26-50%, 0%  Average Supplements Intake: None Ordered  ADULT DIET; Clear Liquid    Anthropometric Measures:  Height: 167.6 cm (5' 5.98\")  Ideal Body Weight (IBW): 142 lbs (65 kg)       Current Body Weight: 51.8 kg (114 lb 3.2 oz) (3/6/25), 80.4 % IBW. Weight Source: Not specified  Current BMI (kg/m2): 18.4           Weight Adjustment For: No Adjustment                 BMI Categories: Underweight (BMI less than 18.5)    Estimated Daily Nutrient Needs:  Energy Requirements Based On: Kcal/kg  Weight Used for Energy Requirements: Current  Energy (kcal/day): 1716 - 1820 (33-35kcal/kg)  Weight Used for Protein Requirements: Ideal  Protein (g/day): 77 - 84 (1.2-1.3g/kg)  Method Used for 
[facility]    Contacted spouse, Lyla, and made arrangements to meet family at patient's bedside @ 12 pm.  Contacted Sacha, RN CC, and left message with update given.  Arrived to room and met with patient and family to discuss hospice philosophy, care, and levels of care with HOC folder of information given to family.  Per Lyla, she would like for patient to go to [facility] once discharged from the hospital, with HOC services.  Contacted Ava, @ [facility], and left VM.  Awaiting return phone call to clarify DME needs and following MD.  Notified Kit of meeting outcome and spoke to hospital RN.    Packet printed with DNR-CC and transport form to be completed.  Scripts needed include Morphine 20 mg/ml (30 ml bottle) 5 mg Q 4 hours PRN and Lorazepam 0.5 mg (30 tabs) 1 tab Q 8 hours PRN.    Thank you,  Maite Baker, BSN, RN  [phone]  
No changes to the Heparin as per pharmacy , the 117.3 aPTT was drawn just after the bolus. Rec no change to the heparin infusion.   
PULMONARY AND CRITICAL CARE INPATIENT NOTE        Marcos Barbosa   : 1930  MRN: [id number]     Admitting Physician: Tho Oh MD  Attending Physician: Werner Alfredo MD  PCP: No primary care provider on file.    Date of Service: 3/8/2025  Admission date:3/6/2025   LOS: Hospital Day: 3   Code:DNR-CCA      ASSESSMENT & PLAN       89 y.o. male patient was admitted on 3/6/2025 with  Chief Complaint   Patient presents with    Abdominal Pain     Intermittently, got worse a few days ago. Recently dx with pulmonary fibrosis. One episode of vomiting a few days ago. Pcp concerned for pnuemonia and dehydration.       Left lower lobe PE with right common femoral vein DVT  Pulmonary fibrosis, UIP pattern/ IPF  Cecal mass with liver metastasis  Anemia  Leukocytosis  HH/GERD    HTN, HLD, CKD      Plan:              Continue heparin drip for acute PE  Recommended to wait on colonoscopy and biopsy until Monday to give time on heparin before interruption of therapy  Supportive care for IPF  Okay to continue prednisone 5 mg daily until the patient is seen by his pulmonologist at [facility] after discharge  Oxygen needs assessment before discharge  Pulmonary toilet  Aspiration precautions  Acid reflux precautions/PPIs      We will sign off. Please let us know if you have any questions.  Available on [email or web address].   Thank you for involving us in this patient's care.      Subjective/Objective       Interval History: Encounter 3/8/2025  Patient remained afebrile and hemodynamically stable.  Oxygen needs down to 2.5 L  I's and O's -600 cc  Patient remained on DuoNebs, heparin drip  No new positive micro results  No new CBC or chemistry  CEA elevated at 8000  Patient had abdominal cramps last night that improved      CC/Reason for Consult:   acute LLL PE, h/o CrCA with mets       HPI: 3/7/2025  89-year-old male patient with PMH of HTN, HLD, CKD, pulmonary fibrosis who was admitted on  with progressive shortness 
Palliative care consult noted. Consult placed for code status, but ordering MD has since addressed code status.   Patient would benefit from GOC discussion.  Discussion would be more meaningful once more diagnostic information is known.  Colonoscopy planned for Monday  Oncology consult pending  Will follow up with patient for potential GOC discussion next week when the pending recommendations should be known and can better guide discussion  
Patient started bowel prep. Instructed both nurse and patient on pharmacy reccs for administration.  
Physical/Occupational Therapy    Chart reviewed, PT/OT orders received, per RN and palliative care note, pt and family have decided to go Hospice at this time. PT/OT will sign off. Please reconsult if there are any further needs that arise. Thank you.     Saulo Chow, PT, DPT.   Polly Campbell, OTR/L  
Physical/Occupational Therapy    PT/OT order received, chart reviewed.  Pt currently off floor for colonoscopy.  Will re-attempt PT/OT evaluation on 3/11/25.  Thank you.    Rosaura Lepe  PT, DPT #[id number]    Polly Campbell, OTR/L  
Pt a/o x4. VSS.  The patient verbalizes understanding of discharge and denies questions.  All belongings collected and sent with the patient. The patient was discharged off the unit by transport in stable condition. Report given to facility nurse. Wife called to notify that pt left unit.  
Pt assessment completed and charted. VSS, pt on 1L of o2. Patient is a/o. Medication given per MAR. Pt denies any pain. Pt pickup is planned for 1pm.    Safety Measures in place:   Bed in lowest position and wheels locked.   Call light within reach.   Bedside table within reach.   Non-skid socks in place.   Pt denies any other needs at this time.    Pt calls out appropriately.  Patient in stable condition when RN left room.  
Pt assessment completed and charted. VSS, pt on RA. Patient is a/o. IV site patent/flushed, saline locked. Medication given per MAR. Pt reports throat and abdominal pain. Provider aware.     Safety Measures in place:   Bed in lowest position and wheels locked.   Call light within reach.   Bedside table within reach.   Non-skid socks in place.   Pt denies any other needs at this time.    Pt calls out appropriately.  Patient in stable condition when RN left room.  
Pt states he had a drop of BRB per urine at end of urination. Urine appears WDL on assessment of color. Will follow up on any additional occurrences.   
Pt transferred back to floor in stable condition per transporter  
Report called to floor nurse. Pt placed in bedwatch for transport back to room  
Resting comfortably, continues to wait for transport  
Normal sinus rhythm with heart rate of 90    Diet: ADULT ORAL NUTRITION SUPPLEMENT; Breakfast, Lunch, Dinner; Standard High Calorie/High Protein Oral Supplement  Diet NPO  ADULT DIET; Clear Liquid    DVT Prophylaxis: []PPx LMWH  [x] Heparin gtt  []IPC/SCDs  []Eliquis  []Xarelto  []Coumadin  [] Heparin Drip  []Other -      Code status: DNR-CCA    PT/OT Eval Status:   [x]NOT yet ordered  []Ordered and Pending   []Seen with Recommendations for:   []Home independently  []Home w/ assist  []HHC  []SNF  []Acute Rehab    Multi-Disciplinary Rounds with Case Management completed on 3/9/2025 with the following recommendations:  Anticipated Discharge Location: []Home w/ [x]HHC vs []SNF  []Acute Rehab  []LTAC  []Hospice  []Other -    Anticipated Discharge Day/Date: In 2 to 3 days  Barriers to Discharge: Awaiting colonoscopy and biopsy    --------------------------------------------------    MDM (any 2 required for High level billing)    A. Problems (any 1)  [x] Acute/Chronic Illness/injury posing ongoing threat to life and/or bodily function without ongoing treatment    [] Severe exacerbation of chronic illness    --------------------------------------------------  B. Risk of Treatment (any 1)    [x] Drugs/treatments that require intensive monitoring for toxicity    [] IV ABX (Vancomycin, Aminoglycosides, etc)     [] Post-Cath/Contrast study requiring serial monitoring    [] IV Narcotic analgesia    [] Aggressive IV diuresis    [] Hypertonic Saline    [] Critical electrolyte abnormalities requiring IV replacement    [] Insulin - Scheduled/SSI or Insulin gtt    [x] Anticoagulation (Heparin gtt or Coumadin - other anticoagulants in special circumstances)    [] Cardiac Medications (IV Amiodarone/Diltiazem, Tikosyn, etc)    [] Hemodialysis    [] Other -    [] Change in code status    [] Decision to escalate care    [] Major surgery/procedure with associated risk factors    --------------------------------------------------  LAM Mckeon

## 2025-03-12 NOTE — CARE COORDINATION
Case Management Discharge Summary- Hospice Discharge    Destination:   [facility]    Hospice Agency name and contact: allie    Durable Equipment arrangements are completed by the Hospice nurse.yes O2    [place] DNR signed and placed in discharge packet AND patient's hard chart. (This is required for DNR patients.)yes    Signed ECOC/AVS faxed to above agency/facility. Sent in transport packet    Transportation arrangements per Hospice RN.  Transport agency name and  time: [email or web address] 1pm    Transport form completed and signed by:  tamiko  Transport form placed with discharge packet: yes    Notified Family:yes wife  Contact name:Marsha    Patient's RN notified of plan:Nuha  Everardo [phone]  rm [phone]  Note:    Discharging nurse to complete nursing portion of ECOC, reconcile AVS, place final copy with patient's discharge packet.  RN to ensure signed prescriptions are sent home with patient or the facility as per nursing protocol.        Brinda Loza, RN

## 2025-03-12 NOTE — PLAN OF CARE
Problem: Pain  Goal: Verbalizes/displays adequate comfort level or baseline comfort level  3/12/2025 1051 by Nuha Martin RN  Outcome: Completed  3/12/2025 1026 by Nuha Martin RN  Outcome: Progressing  Flowsheets (Taken 3/11/2025 2141 by Adina Evans RN)  Verbalizes/displays adequate comfort level or baseline comfort level:   Encourage patient to monitor pain and request assistance   Assess pain using appropriate pain scale   Administer analgesics based on type and severity of pain and evaluate response   Implement non-pharmacological measures as appropriate and evaluate response   Consider cultural and social influences on pain and pain management   Notify Licensed Independent Practitioner if interventions unsuccessful or patient reports new pain  3/12/2025 0308 by Adina Evans RN  Outcome: Progressing  Flowsheets (Taken 3/11/2025 2141)  Verbalizes/displays adequate comfort level or baseline comfort level:   Encourage patient to monitor pain and request assistance   Assess pain using appropriate pain scale   Administer analgesics based on type and severity of pain and evaluate response   Implement non-pharmacological measures as appropriate and evaluate response   Consider cultural and social influences on pain and pain management   Notify Licensed Independent Practitioner if interventions unsuccessful or patient reports new pain     Problem: ABCDS Injury Assessment  Goal: Absence of physical injury  3/12/2025 1051 by Nuha Martin RN  Outcome: Completed  3/12/2025 1026 by Nuha Martin RN  Outcome: Progressing  Flowsheets (Taken 3/12/2025 0308 by Adina Evans RN)  Absence of Physical Injury: Implement safety measures based on patient assessment  3/12/2025 0308 by Adina Evans RN  Outcome: Progressing  Flowsheets (Taken 3/12/2025 0308)  Absence of Physical Injury: Implement safety measures based on patient assessment     Problem: Safety - Adult  Goal: Free from fall injury  3/12/2025 1051 by Nuha Martin

## 2025-03-12 NOTE — DISCHARGE SUMMARY
with usual interstitial pneumonitis. Mediastinum: No mediastinal lymphadenopathy. No hilar lymphadenopathy. Moderate hiatal hernia with fluid the esophagus compatible with reflux. Aortic valvular calcification. Coronary artery calcification. Neck and chest wall: No supra clavicular lymphadenopathy. No axillary lymphadenopathy. Upper abdomen: Multiple hypodense lesions in the liver compatible with metastatic disease. Please refer to the abdominal CT dictation. Bones: Kyphosis. No acute osseous abnormality.     1. Segmental pulmonary thromboembolism in the left lower lung without evidence of right heart strain by CT criteria. 2. Findings of chronic interstitial lung disease with usual interstitial pneumonitis pattern. 3. Aortic valvular calcification and coronary artery calcification. 4. Large hiatal hernia with gastroesophageal reflux. 5. Multiple hypodense liver lesions suspicious for hepatic metastatic disease. Electronically signed by Alexis Jimenez MD    XR CHEST PORTABLE  Result Date: 3/6/2025  EXAM: PORTABLE CHEST X-RAY ON 3/6/2025 AT 1545 HOURS INDICATION: Shortness of breath COMPARISON: None FINDINGS: LIMITATIONS:Portable exam LINES/TUBES: None HEART / MEDIASTINUM: Heart size is normal. The trachea is midline. There is calcification of the aortic arch. PLEURAL SPACES: The costophrenic angles are clear. There is no pneumothorax. LUNGS: Coarse interstitial markings are seen in the lung peripheries and in the lung bases. No dense lobar consolidation is seen. BONES / SOFT TISSUES: No significant abnormality. OTHER: None.     Coarse interstitial markings in the lung periphery predominantly in the bases. This is likely a chronic process but no previous films are available for comparison. Electronically signed by Kamini Torrez      Consults:     IP CONSULT TO PULMONOLOGY  IP CONSULT TO ONCOLOGY  IP CONSULT TO GI  IP CONSULT TO PALLIATIVE CARE  IP CONSULT TO HOSPICE    Labs:     No results for input(s): \"WBC\",

## 2025-03-12 NOTE — PLAN OF CARE
Problem: Pain  Goal: Verbalizes/displays adequate comfort level or baseline comfort level  3/12/2025 1026 by Nuha Martin RN  Outcome: Progressing  Flowsheets (Taken 3/11/2025 2141 by Adina Evans RN)  Verbalizes/displays adequate comfort level or baseline comfort level:   Encourage patient to monitor pain and request assistance   Assess pain using appropriate pain scale   Administer analgesics based on type and severity of pain and evaluate response   Implement non-pharmacological measures as appropriate and evaluate response   Consider cultural and social influences on pain and pain management   Notify Licensed Independent Practitioner if interventions unsuccessful or patient reports new pain  3/12/2025 0308 by Adina Evans RN  Outcome: Progressing  Flowsheets (Taken 3/11/2025 2141)  Verbalizes/displays adequate comfort level or baseline comfort level:   Encourage patient to monitor pain and request assistance   Assess pain using appropriate pain scale   Administer analgesics based on type and severity of pain and evaluate response   Implement non-pharmacological measures as appropriate and evaluate response   Consider cultural and social influences on pain and pain management   Notify Licensed Independent Practitioner if interventions unsuccessful or patient reports new pain     Problem: ABCDS Injury Assessment  Goal: Absence of physical injury  3/12/2025 1026 by Nuha Martin RN  Outcome: Progressing  Flowsheets (Taken 3/12/2025 0308 by Adina Evans, RN)  Absence of Physical Injury: Implement safety measures based on patient assessment  3/12/2025 0308 by Adina Evans RN  Outcome: Progressing  Flowsheets (Taken 3/12/2025 0308)  Absence of Physical Injury: Implement safety measures based on patient assessment     Problem: Safety - Adult  Goal: Free from fall injury  3/12/2025 1026 by Nuha Martin RN  Outcome: Progressing  Flowsheets (Taken 3/12/2025 0308 by Adina Evans RN)  Free From Fall Injury:   Instruct

## 2025-03-12 NOTE — CONSULTS
Pharmacy to Manage Heparin Infusion per Hospital Nomogram    Dx: acute PE  Weight: 52.5 kg   Baseline aPTT: ordered    Labs:  Recent Labs     03/06/25  1544   HGB 9.5*   HCT 31.8*        Heparin (weight-based) Infusion: VTE/DVT/PE  Will initiate heparin therapy with a bolus of 4200 units (80 units/kg) followed by heparin infusion at 18 units/kg/hr.  Plan to check anti-Xa in 6 hours.  Goal anti-Xa 0.3 - 0.7 units/mL    Thank you for the consult!   Diane Gibson, PharmD, [facility] [id number]    3/7/2025  at 0200  anti-Xa level = 0.43 IU/mL  Continue current heparin infusion rate   Recheck level 0800  Carlos Gipson PharmD 3/7/2025  3:52 AM      3/7/2025  anti-Xa level = 0.53 IU/mL at 0802  No change to Heparin at this time, continue Heparin infusion at 18 units/kg/hr  Daily level ordered  Next anti-Xa tomorrow (3/8) AM  Demetria Marino PharmD  3/7/2025 9:03 AM     3/9 @ 0543  anti-Xa level = 0.40 IU/mL  No change to Heparin at this time, continue Heparin infusion at 18 units/kg/hr  Daily level ordered  Next anti-Xa tomorrow (3/10) AM    Saulo Arroyo PharmD 3/9/2025 7:09 AM    3/10 0626  Anti-Xa - 0.44  Continue heparin infusion at 18 units/kg/hr  Next anti-Xa 3/11  Alexandre Hernández PharmD, [facility] 3/10/2025 7:22 AM    3/10/2025  Order held by provider  Demetria Marino PharmD  3/10/2025 8:30 AM    Heparin drip resumed at 1800  Recheck Anti Xa at 0000  Med Salguero PharmD  3/10/2025 at 8:20 PM    3/10 at 2341  Anti-Xa: 0.19  Give 2100 unit bolus  Increase heparin infusion rate to 20 units/kg/hr  Recheck anti-Xa at 0700  Carlos Gipson PharmD 3/11/2025  12:42 AM  
Consult Call Back    Who:Jaida Cintron, EVITA - CNP   Date:3/7/2025,  Time:2:42 PM    Electronically signed by Michaela Logan on 3/7/25 at 2:42 PM EST     
Consult Call Back    Who:Nichole Sahu MD   Date:3/7/2025,  Time:12:50 PM    Electronically signed by Michaela Logan on 3/7/25 at 12:50 PM EST     
Consult Placed     Who: Dr. Lenz  Date: 3/7/2025  Time: 8:57 AM       Electronically signed by Michaela Logan on 3/7/2025 at 8:57 AM  
Consult Placed   Added to the treatment team  Who: Marie Monge  Date: 3/7/2025  Time: 2:39 PM       Electronically signed by Michaela Logan on 3/7/2025 at 2:39 PM  
Consult Placed   Consult sent via perfect serve  Who: Dejan Lao  Date: 3/7/2025  Time: 7:49 AM       Electronically signed by Michaela Logan on 3/7/2025 at 7:48 AM  
Consult Placed   Consult sent via perfect serve  Who: Dr. Sahu  Date: 3/7/2025  Time: 7:55 AM       Electronically signed by Michaela Logan on 3/7/2025 at 7:54 AM  
HOC       Arrived to unit and spoke with hospital RN to obtain report.   Met with patient and family to discuss hospice philosophy, services, and levels of care. Per spouse, she would like to proceed with HOC services at [facility] today.  Contacted Ava, in admissions @ LTC facility and left VM requesting call back to review fax number and pharmacy.   Received call back from Ava- RN will fax chart, meds and DNR CC to [phone]  Consent signed yesterday with Maite BARRAZA RN and wife.    Pt scheduled with admission RN Lisa for today between 16:00- 16:30 with Marsha galloway.   DME ordered and requested to be delivered betw 9 am- 12 pm today.   [place] transporting at 13:00 today to [facility]  Maite BARRAZA RN Contacted Dr Reinier Santizo and obtained verbal CTI with the termin Dr Steven Shultz obtain cert.  Updated Kit CM RN.   RN will send report to appropriate team and GLORIA Breen agreed to call report to LTC.       Thank you for this referral,   Please call HOC with questions/ concerns at [phone]  Mary Smart RN   HOC Admissions Liaison     
cecal mass with hepatic metastasis with suspected deep vein thrombosis in the right common femoral vein.    The following portions of the patient's history were reviewed: past medical history, surgical history, family history, social history, current medications & allergies.     ROS:   Relevant systems reviewed and the only positive symptoms are mentioned in the history present illness.      Objective    Test Results:  Imaging:  I have reviewed radiology images personally.    CT ABDOMEN PELVIS W IV CONTRAST Additional Contrast? None    Result Date: 3/6/2025  1. Extensive hepatic metastatic disease. 2. Mass in the region of the cecum most consistent with primary colon cancer. 3. Suspected deep venous thrombosis in the right common femoral vein. 4. Extensive diverticulosis without diverticulitis. 5. Gallbladder distention without acute inflammation identified. Cholecystitis cannot be excluded. Electronically signed by Alexis Jimenez MD    CT CHEST PULMONARY EMBOLISM W CONTRAST    Result Date: 3/6/2025  1. Segmental pulmonary thromboembolism in the left lower lung without evidence of right heart strain by CT criteria. 2. Findings of chronic interstitial lung disease with usual interstitial pneumonitis pattern. 3. Aortic valvular calcification and coronary artery calcification. 4. Large hiatal hernia with gastroesophageal reflux. 5. Multiple hypodense liver lesions suspicious for hepatic metastatic disease. Electronically signed by Alexis Jimenez MD    XR CHEST PORTABLE    Result Date: 3/6/2025  Coarse interstitial markings in the lung periphery predominantly in the bases. This is likely a chronic process but no previous films are available for comparison. Electronically signed by Kamini Torrez         Cardiology:      Labs reviewed:  CBC:   Recent Labs     03/06/25  1544 03/07/25  0200   WBC 18.6* 15.5*   HGB 9.5* 8.6*   HCT 31.8* 28.5*   MCV 78.0* 78.2*    343     BMP:   Recent Labs     03/06/25  1544 
allergies listed in the above medical record.        Electronically signed by: EVITA Jean-Baptiste 3/7/2025 8:09 AM           (Office) [phone]  (Fax) [phone]  Available via perfect serve           
reduced; LOC full/confusion   [x] 20%  Bed bound; Extensive disease; Total care; Intake minimal; Drowsy/coma - current   [] 10%  Bed bound; Extensive disease; Total care; Mouth care only; Drowsy/coma   []  0%   Death       Home med list and hospital medications reviewed in chart as of 3/10/2025     EXAM     Vitals:    03/10/25 1340   BP: 117/67   Pulse:    Resp:    Temp:    SpO2:                   OBJECTIVE   /67   Pulse 75   Temp 97.6 °F (36.4 °C) (Oral)   Resp 18   Ht 1.676 m (5' 5.98\")   Wt 51.8 kg (114 lb 1.6 oz)   SpO2 95%   BMI 18.43 kg/m²   I/O last 3 completed shifts:  In: 240 [P.O.:240]  Out: 500 [Urine:500]  I/O this shift:  In: -   Out: 2 [Stool:2]      Palliative Medicine Interventions:    patient/family support  Goals of Care discussions with patient/surrogate  Spiritual Interventions:  offered discussion and support        DATA:  Current labs in the epic chart reviewed as of 3/10/2025   Review of previous notes, admits, labs, radiology and testing relevant to this consult done in this chart today 3/10/2025    Medical Decision Making:  The following items were considered in medical decision making:  Review of prior external note(s) from each unique source relevant to today's visit: Hospitalist, Case management, PT/OT, oncology, GI, pulmonology  Discussion of management or test with external physician/qualified health care professional: Hospitalist, Case management, oncology  Unique test results reviewed: CBC and BMP, Liver Studies, Cardiac studies, CT [place], A/P       Risk of Complications/Morbidity: High   Illness(es)/ Infection present that pose threat to bodily function.   There is potential for severe exacerbation of condition/side effects of treatment.  Therapy requires intensive monitoring for toxicity        Palliative Medicine Provider (MD/NP)  Advance Care Planning (ACP) Conversation      Date of Conversation: 03/10/25    The patient and/or authorized decision maker consented to a 
interstitial markings are seen in the lung peripheries and in the  lung bases. No dense lobar consolidation is seen.    BONES / SOFT TISSUES: No significant abnormality.    OTHER: None.  Impression: Coarse interstitial markings in the lung periphery predominantly in the bases.  This is likely a chronic process but no previous films are available for  comparison.    Electronically signed by Kamini Torrez      Problem List  Patient Active Problem List   Diagnosis    Pulmonary embolus, left (HCC)       IMPRESSION/RECOMMENDATIONS:    Liver Lesions   Colon Mass  - Imaging   - CT Chest 03/06/2025 showed multiple hypodense liver lesions suspicious for hepatic metastatic disease   - CT AP 03/06/2025 showed    - extensive hepatic metastatic disease   - mass in the region of the cecum most consistent with primary colon cancer  - Tumor markers   - CEA PENDING   - AFP PENDING   - Discussed findings above at length with patient and family at bedside including  recommendations, if patient wanting to remain aggressive with workup, for scope to assess the mass and obtain biopsy. If determined to be obstructive/near obstruction would recommend general surgery consultation for further discussion/evaluation. Reviewed that this appears on imaging to be evidence of stage IV malignancy at time of diagnosis (suspected metastatic colon cancer) and that the overall goal of treatment would be to prevent further spread rather than curative intent. Discussed that chemotherapy would likely be indicated although would depend on performance status/etc. Patient/family not sure what their wishes would be in regards to aggressive treatment although they state they would want him to have colonoscopy to obtain further information and assist with determining next steps. GI planning colonoscopy on 03/10/2025.     Pulmonary Embolism  Acute DVT   - likely provoked secondary to suspected underlying malignancy   - CT Chest 03/06/2025 showed segmental

## 2025-03-12 NOTE — PLAN OF CARE
Problem: Pain  Goal: Verbalizes/displays adequate comfort level or baseline comfort level  Outcome: Progressing  Flowsheets (Taken 3/11/2025 2141)  Verbalizes/displays adequate comfort level or baseline comfort level:   Encourage patient to monitor pain and request assistance   Assess pain using appropriate pain scale   Administer analgesics based on type and severity of pain and evaluate response   Implement non-pharmacological measures as appropriate and evaluate response   Consider cultural and social influences on pain and pain management   Notify Licensed Independent Practitioner if interventions unsuccessful or patient reports new pain     Problem: ABCDS Injury Assessment  Goal: Absence of physical injury  Outcome: Progressing  Flowsheets (Taken 3/12/2025 0308)  Absence of Physical Injury: Implement safety measures based on patient assessment     Problem: Safety - Adult  Goal: Free from fall injury  Outcome: Progressing  Flowsheets (Taken 3/12/2025 0308)  Free From Fall Injury:   Instruct family/caregiver on patient safety   Based on caregiver fall risk screen, instruct family/caregiver to ask for assistance with transferring infant if caregiver noted to have fall risk factors     Problem: Nutrition Deficit:  Goal: Optimize nutritional status  Outcome: Progressing  Flowsheets (Taken 3/12/2025 0308)  Nutrient intake appropriate for improving, restoring, or maintaining nutritional needs:   Assess nutritional status and recommend course of action   Monitor oral intake, labs, and treatment plans   Recommend appropriate diets, oral nutritional supplements, and vitamin/mineral supplements   Order, calculate, and assess calorie counts as needed   Provide specific nutrition education to patient or family as appropriate     Problem: Skin/Tissue Integrity  Goal: Skin integrity remains intact  Description: 1.  Monitor for areas of redness and/or skin breakdown  2.  Assess vascular access sites hourly  3.  Every 4-6

## (undated) DEVICE — CHLORAPREP 26ML ORANGE

## (undated) DEVICE — MATERIAL PD W2XL4YD ST COT CAST SPLNT NONADHESIVE SPEC

## (undated) DEVICE — COMFO-TEX ELASTIC BANDAGE LATEX FREE, 3INX5YD: Brand: COMFO-TEX™

## (undated) DEVICE — PADDING CAST W4INXL4YD NONSTERILE COT RAYON MICROPLEATED

## (undated) DEVICE — GLOVE,SURG,SENSICARE,ALOE,LF,PF,7: Brand: MEDLINE

## (undated) DEVICE — ELECTRODE,ECG,STRESS,FOAM,3PK: Brand: MEDLINE

## (undated) DEVICE — ZIMMER® STERILE DISPOSABLE TOURNIQUET CUFF WITH PLC, DUAL PORT, SINGLE BLADDER, 18 IN. (46 CM)

## (undated) DEVICE — CORD ES L12FT BPLR FRCP

## (undated) DEVICE — Device

## (undated) DEVICE — ZIMMER® STERILE DISPOSABLE TOURNIQUET CUFF WITH PLC, DUAL PORT, SINGLE BLADDER, 12 IN. (30 CM)

## (undated) DEVICE — CANNULA NSL AD TBNG L7FT PVC STR NONFLARED PRNG O2 DEL W STD

## (undated) DEVICE — SOLUTION IV IRRIG 500ML 0.9% SODIUM CHL 2F7123

## (undated) DEVICE — GLOVE SURG SZ 75 L12IN FNGR THK94MIL STD WHT LTX FREE

## (undated) DEVICE — FORCEPS BX L240CM JAW DIA2.8MM L CAP W/ NDL MIC MESH TOOTH

## (undated) DEVICE — ABDOMINAL PAD: Brand: DERMACEA

## (undated) DEVICE — GOWN,SIRUS,NON REINFRCD,LARGE,SET IN SL: Brand: MEDLINE

## (undated) DEVICE — SUTURE ETHLN SZ 4-0 L18IN NONABSORBABLE BLK L19MM PS-2 3/8 1667H

## (undated) DEVICE — NEEDLE HYPO 25GA L1.5IN BLU POLYPR HUB S STL REG BVL STR

## (undated) DEVICE — SYRINGE, LUER LOCK, 10ML: Brand: MEDLINE

## (undated) DEVICE — ENDOSCOPIC KIT 2 12 FT OP4 DE2 GWN SYR

## (undated) DEVICE — STANDARD HYPODERMIC NEEDLE,POLYPROPYLENE HUB: Brand: MONOJECT